# Patient Record
Sex: MALE | Race: WHITE | HISPANIC OR LATINO | URBAN - METROPOLITAN AREA
[De-identification: names, ages, dates, MRNs, and addresses within clinical notes are randomized per-mention and may not be internally consistent; named-entity substitution may affect disease eponyms.]

---

## 2023-07-04 ENCOUNTER — HOSPITAL ENCOUNTER (INPATIENT)
Facility: HOSPITAL | Age: 63
LOS: 3 days | Discharge: HOME | DRG: 378 | End: 2023-07-07
Attending: EMERGENCY MEDICINE | Admitting: INTERNAL MEDICINE
Payer: COMMERCIAL

## 2023-07-04 DIAGNOSIS — K62.5 GASTROINTESTINAL HEMORRHAGE ASSOCIATED WITH ANORECTAL SOURCE: Primary | ICD-10-CM

## 2023-07-04 DIAGNOSIS — K92.2 GI BLEED: ICD-10-CM

## 2023-07-04 PROBLEM — K42.9 UMBILICAL HERNIA WITHOUT OBSTRUCTION AND WITHOUT GANGRENE: Status: ACTIVE | Noted: 2023-07-04

## 2023-07-04 LAB
ABO + RH BLD: NORMAL
ABO + RH BLD: NORMAL
ALBUMIN SERPL BCP-MCNC: 2.5 G/DL (ref 3.5–5.2)
ALP SERPL-CCNC: 37 U/L (ref 55–135)
ALT SERPL W/O P-5'-P-CCNC: 12 U/L (ref 10–44)
ANION GAP SERPL CALC-SCNC: 7 MMOL/L (ref 8–16)
AST SERPL-CCNC: 13 U/L (ref 10–40)
BASOPHILS # BLD AUTO: 0.02 K/UL (ref 0–0.2)
BASOPHILS # BLD AUTO: 0.03 K/UL (ref 0–0.2)
BASOPHILS NFR BLD: 0.3 % (ref 0–1.9)
BASOPHILS NFR BLD: 0.3 % (ref 0–1.9)
BILIRUB SERPL-MCNC: 0.2 MG/DL (ref 0.1–1)
BILIRUB UR QL STRIP: NEGATIVE
BLD GP AB SCN CELLS X3 SERPL QL: NORMAL
BLD PROD TYP BPU: NORMAL
BLOOD UNIT EXPIRATION DATE: NORMAL
BLOOD UNIT TYPE CODE: 5100
BLOOD UNIT TYPE: NORMAL
BNP SERPL-MCNC: <10 PG/ML (ref 0–99)
BUN SERPL-MCNC: 19 MG/DL (ref 8–23)
CALCIUM SERPL-MCNC: 7.1 MG/DL (ref 8.7–10.5)
CHLORIDE SERPL-SCNC: 112 MMOL/L (ref 95–110)
CLARITY UR REFRACT.AUTO: CLEAR
CO2 SERPL-SCNC: 22 MMOL/L (ref 23–29)
CODING SYSTEM: NORMAL
COLOR UR AUTO: YELLOW
CREAT SERPL-MCNC: 1.1 MG/DL (ref 0.5–1.4)
CROSSMATCH INTERPRETATION: NORMAL
DIFFERENTIAL METHOD: ABNORMAL
DIFFERENTIAL METHOD: ABNORMAL
DISPENSE STATUS: NORMAL
EOSINOPHIL # BLD AUTO: 0 K/UL (ref 0–0.5)
EOSINOPHIL # BLD AUTO: 0.1 K/UL (ref 0–0.5)
EOSINOPHIL NFR BLD: 0.6 % (ref 0–8)
EOSINOPHIL NFR BLD: 0.8 % (ref 0–8)
ERYTHROCYTE [DISTWIDTH] IN BLOOD BY AUTOMATED COUNT: 17.2 % (ref 11.5–14.5)
ERYTHROCYTE [DISTWIDTH] IN BLOOD BY AUTOMATED COUNT: 19.2 % (ref 11.5–14.5)
EST. GFR  (NO RACE VARIABLE): >60 ML/MIN/1.73 M^2
GLUCOSE SERPL-MCNC: 130 MG/DL (ref 70–110)
GLUCOSE UR QL STRIP: NEGATIVE
HCT VFR BLD AUTO: 17.3 % (ref 40–54)
HCT VFR BLD AUTO: 19 % (ref 40–54)
HGB BLD-MCNC: 5.2 G/DL (ref 14–18)
HGB BLD-MCNC: 6 G/DL (ref 14–18)
HGB UR QL STRIP: NEGATIVE
HYPOCHROMIA BLD QL SMEAR: ABNORMAL
IMM GRANULOCYTES # BLD AUTO: 0.01 K/UL (ref 0–0.04)
IMM GRANULOCYTES # BLD AUTO: 0.06 K/UL (ref 0–0.04)
IMM GRANULOCYTES NFR BLD AUTO: 0.2 % (ref 0–0.5)
IMM GRANULOCYTES NFR BLD AUTO: 0.7 % (ref 0–0.5)
KETONES UR QL STRIP: NEGATIVE
LEUKOCYTE ESTERASE UR QL STRIP: NEGATIVE
LYMPHOCYTES # BLD AUTO: 2.1 K/UL (ref 1–4.8)
LYMPHOCYTES # BLD AUTO: 2.4 K/UL (ref 1–4.8)
LYMPHOCYTES NFR BLD: 26.7 % (ref 18–48)
LYMPHOCYTES NFR BLD: 33.3 % (ref 18–48)
MCH RBC QN AUTO: 26.9 PG (ref 27–31)
MCH RBC QN AUTO: 28.2 PG (ref 27–31)
MCHC RBC AUTO-ENTMCNC: 30.1 G/DL (ref 32–36)
MCHC RBC AUTO-ENTMCNC: 31.6 G/DL (ref 32–36)
MCV RBC AUTO: 89 FL (ref 82–98)
MCV RBC AUTO: 90 FL (ref 82–98)
MONOCYTES # BLD AUTO: 0.3 K/UL (ref 0.3–1)
MONOCYTES # BLD AUTO: 0.6 K/UL (ref 0.3–1)
MONOCYTES NFR BLD: 5.3 % (ref 4–15)
MONOCYTES NFR BLD: 6.5 % (ref 4–15)
NEUTROPHILS # BLD AUTO: 3.7 K/UL (ref 1.8–7.7)
NEUTROPHILS # BLD AUTO: 5.8 K/UL (ref 1.8–7.7)
NEUTROPHILS NFR BLD: 60.3 % (ref 38–73)
NEUTROPHILS NFR BLD: 65 % (ref 38–73)
NITRITE UR QL STRIP: NEGATIVE
NRBC BLD-RTO: 0 /100 WBC
NRBC BLD-RTO: 0 /100 WBC
NUM UNITS TRANS PACKED RBC: NORMAL
PH UR STRIP: 6 [PH] (ref 5–8)
PLATELET # BLD AUTO: 182 K/UL (ref 150–450)
PLATELET # BLD AUTO: 186 K/UL (ref 150–450)
PLATELET BLD QL SMEAR: ABNORMAL
PMV BLD AUTO: 10.4 FL (ref 9.2–12.9)
PMV BLD AUTO: 10.4 FL (ref 9.2–12.9)
POTASSIUM SERPL-SCNC: 3.9 MMOL/L (ref 3.5–5.1)
PROT SERPL-MCNC: 4.8 G/DL (ref 6–8.4)
PROT UR QL STRIP: ABNORMAL
RBC # BLD AUTO: 1.93 M/UL (ref 4.6–6.2)
RBC # BLD AUTO: 2.13 M/UL (ref 4.6–6.2)
SODIUM SERPL-SCNC: 141 MMOL/L (ref 136–145)
SP GR UR STRIP: >1.03 (ref 1–1.03)
SPECIMEN OUTDATE: NORMAL
TROPONIN I SERPL DL<=0.01 NG/ML-MCNC: 0.01 NG/ML (ref 0–0.03)
URN SPEC COLLECT METH UR: ABNORMAL
WBC # BLD AUTO: 6.19 K/UL (ref 3.9–12.7)
WBC # BLD AUTO: 8.92 K/UL (ref 3.9–12.7)

## 2023-07-04 PROCEDURE — 99291 CRITICAL CARE FIRST HOUR: CPT

## 2023-07-04 PROCEDURE — 63600175 PHARM REV CODE 636 W HCPCS

## 2023-07-04 PROCEDURE — 86920 COMPATIBILITY TEST SPIN: CPT

## 2023-07-04 PROCEDURE — 99223 1ST HOSP IP/OBS HIGH 75: CPT | Mod: ,,, | Performed by: INTERNAL MEDICINE

## 2023-07-04 PROCEDURE — 93005 ELECTROCARDIOGRAM TRACING: CPT

## 2023-07-04 PROCEDURE — 80053 COMPREHEN METABOLIC PANEL: CPT | Performed by: EMERGENCY MEDICINE

## 2023-07-04 PROCEDURE — 25500020 PHARM REV CODE 255: Performed by: EMERGENCY MEDICINE

## 2023-07-04 PROCEDURE — P9016 RBC LEUKOCYTES REDUCED: HCPCS

## 2023-07-04 PROCEDURE — 85025 COMPLETE CBC W/AUTO DIFF WBC: CPT | Performed by: EMERGENCY MEDICINE

## 2023-07-04 PROCEDURE — 86900 BLOOD TYPING SEROLOGIC ABO: CPT | Mod: 91 | Performed by: EMERGENCY MEDICINE

## 2023-07-04 PROCEDURE — 20000000 HC ICU ROOM

## 2023-07-04 PROCEDURE — 25000003 PHARM REV CODE 250

## 2023-07-04 PROCEDURE — 96365 THER/PROPH/DIAG IV INF INIT: CPT

## 2023-07-04 PROCEDURE — 93010 ELECTROCARDIOGRAM REPORT: CPT | Mod: ,,, | Performed by: INTERNAL MEDICINE

## 2023-07-04 PROCEDURE — 83880 ASSAY OF NATRIURETIC PEPTIDE: CPT | Performed by: EMERGENCY MEDICINE

## 2023-07-04 PROCEDURE — 84484 ASSAY OF TROPONIN QUANT: CPT | Performed by: EMERGENCY MEDICINE

## 2023-07-04 PROCEDURE — 86900 BLOOD TYPING SEROLOGIC ABO: CPT | Performed by: EMERGENCY MEDICINE

## 2023-07-04 PROCEDURE — 96360 HYDRATION IV INFUSION INIT: CPT | Mod: 59

## 2023-07-04 PROCEDURE — 81003 URINALYSIS AUTO W/O SCOPE: CPT

## 2023-07-04 PROCEDURE — 85025 COMPLETE CBC W/AUTO DIFF WBC: CPT | Mod: 91 | Performed by: EMERGENCY MEDICINE

## 2023-07-04 PROCEDURE — 99223 PR INITIAL HOSPITAL CARE,LEVL III: ICD-10-PCS | Mod: ,,, | Performed by: INTERNAL MEDICINE

## 2023-07-04 PROCEDURE — 36430 TRANSFUSION BLD/BLD COMPNT: CPT

## 2023-07-04 PROCEDURE — 93010 EKG 12-LEAD: ICD-10-PCS | Mod: ,,, | Performed by: INTERNAL MEDICINE

## 2023-07-04 RX ORDER — METRONIDAZOLE 500 MG/100ML
500 INJECTION, SOLUTION INTRAVENOUS
Status: DISCONTINUED | OUTPATIENT
Start: 2023-07-04 | End: 2023-07-05

## 2023-07-04 RX ORDER — TALC
6 POWDER (GRAM) TOPICAL NIGHTLY PRN
Status: DISCONTINUED | OUTPATIENT
Start: 2023-07-04 | End: 2023-07-07 | Stop reason: HOSPADM

## 2023-07-04 RX ORDER — SODIUM CHLORIDE 0.9 % (FLUSH) 0.9 %
10 SYRINGE (ML) INJECTION
Status: DISCONTINUED | OUTPATIENT
Start: 2023-07-04 | End: 2023-07-07 | Stop reason: HOSPADM

## 2023-07-04 RX ORDER — CALCIUM GLUCONATE 20 MG/ML
1 INJECTION, SOLUTION INTRAVENOUS
Status: COMPLETED | OUTPATIENT
Start: 2023-07-04 | End: 2023-07-04

## 2023-07-04 RX ORDER — ACETAMINOPHEN 325 MG/1
650 TABLET ORAL EVERY 4 HOURS PRN
Status: DISCONTINUED | OUTPATIENT
Start: 2023-07-04 | End: 2023-07-07 | Stop reason: HOSPADM

## 2023-07-04 RX ORDER — ONDANSETRON 2 MG/ML
4 INJECTION INTRAMUSCULAR; INTRAVENOUS EVERY 8 HOURS PRN
Status: DISCONTINUED | OUTPATIENT
Start: 2023-07-04 | End: 2023-07-07 | Stop reason: HOSPADM

## 2023-07-04 RX ORDER — CIPROFLOXACIN 2 MG/ML
400 INJECTION, SOLUTION INTRAVENOUS
Status: DISCONTINUED | OUTPATIENT
Start: 2023-07-04 | End: 2023-07-05

## 2023-07-04 RX ORDER — HYDROCODONE BITARTRATE AND ACETAMINOPHEN 500; 5 MG/1; MG/1
TABLET ORAL
Status: DISCONTINUED | OUTPATIENT
Start: 2023-07-04 | End: 2023-07-05

## 2023-07-04 RX ADMIN — METRONIDAZOLE 500 MG: 500 INJECTION, SOLUTION INTRAVENOUS at 08:07

## 2023-07-04 RX ADMIN — CIPROFLOXACIN 400 MG: 2 INJECTION, SOLUTION INTRAVENOUS at 08:07

## 2023-07-04 RX ADMIN — CALCIUM GLUCONATE 1 G: 20 INJECTION, SOLUTION INTRAVENOUS at 03:07

## 2023-07-04 RX ADMIN — SODIUM CHLORIDE, POTASSIUM CHLORIDE, SODIUM LACTATE AND CALCIUM CHLORIDE 1000 ML: 600; 310; 30; 20 INJECTION, SOLUTION INTRAVENOUS at 01:07

## 2023-07-04 RX ADMIN — IOHEXOL 100 ML: 350 INJECTION, SOLUTION INTRAVENOUS at 03:07

## 2023-07-04 NOTE — CONSULTS
Critical Care Consult Acknowledgement.    Patient seen and evaluated at bedside. Dispo pending repeat CBC.    John Loo M.D.  [facility] Pulmonary & Critical Care Fellow

## 2023-07-04 NOTE — ASSESSMENT & PLAN NOTE
"- CTA without evidence of acute bleed  - Patient HD stable, systolics in 140s, normal mentation  - Reports one stool at 2 AM with dark bloody clots. Has had flatus with no blood expulsion.  - 2 uPRBC given this morning  - Most recent Hgb 6.2, was 5.0 in ED  - Continue to monitor for signs of rebleeding  - GI consulted and plan to scope on 7/6    Patient report resolution of lightheadedness, dizziness, and nausea. Says "he feels great."  No acute bleeding signs on CTA. Hemodynamically stable. Stepdown today.  "

## 2023-07-04 NOTE — EKG INTERPRETATIONS - EMERGENCY DEPT.
Independently interpreted by MD:  Rate 94, NSR, no stemi, no ectopy, no hypertrophy, flipped T waves inf/lat, long QT

## 2023-07-04 NOTE — HPI
Mr. Guzman is a 62 year old male with PMHx of diverticular bleeds and an umbilical hernia. He presented to the emergency with a chief complaint of hematochezia since Sunday. This morning he felt lightheaded and dizzy and he was unable to stand up, so he called the paramedics. He denies chest pain, abdominal pain, rectal pain, and shortness of breath. He denies use of blood thinners. Patient says his hematochezia is sporadic and typically resolves on its own. Patient lives in [place] and is seen by GI docs at home. Believes last colonoscopy was 2 or 3 years ago.

## 2023-07-04 NOTE — SUBJECTIVE & OBJECTIVE
Past Medical History:   Diagnosis Date    Diverticular disease of large intestine without perforation or abscess     Hypertension        History reviewed. No pertinent surgical history.    Review of patient's allergies indicates:  No Known Allergies    Family History    None       Tobacco Use    Smoking status: Not on file    Smokeless tobacco: Not on file   Substance and Sexual Activity    Alcohol use: Not on file    Drug use: Not on file    Sexual activity: Not on file      Review of Systems   Constitutional:  Negative for chills, diaphoresis and fever.   Respiratory:  Negative for cough, chest tightness and shortness of breath.    Cardiovascular:  Negative for chest pain, palpitations and leg swelling.   Gastrointestinal:  Positive for blood in stool. Negative for abdominal pain, nausea, rectal pain and vomiting.        Dark blood in stool on BM at 2 AM   Genitourinary:  Negative for decreased urine volume, difficulty urinating, dysuria, flank pain and hematuria.   Musculoskeletal:  Negative for back pain, joint swelling, myalgias and neck pain.   Skin:  Negative for color change and pallor.   Neurological:  Negative for dizziness, syncope and light-headedness.   Objective:     Vital Signs (Most Recent):  Temp: (P) 97.9 °F (36.6 °C) (07/05/23 0800)  Pulse: 77 (07/05/23 0615)  Resp: 14 (07/05/23 0615)  BP: 125/62 (07/05/23 0615)  SpO2: (!) 94 % (07/05/23 0615) Vital Signs (24h Range):  Temp:  [97.7 °F (36.5 °C)-99.4 °F (37.4 °C)] (P) 97.9 °F (36.6 °C)  Pulse:  [71-95] 77  Resp:  [10-33] 14  SpO2:  [93 %-100 %] 94 %  BP: (108-177)/(51-84) 125/62   Weight: (!) 136.9 kg (301 lb 13 oz)  Body mass index is 43.31 kg/m².      Intake/Output Summary (Last 24 hours) at 7/5/2023 1012  Last data filed at 7/5/2023 0600  Gross per 24 hour   Intake 2937.51 ml   Output --   Net 2937.51 ml          Physical Exam  Constitutional:       General: He is not in acute distress.     Appearance: Normal appearance. He is not ill-appearing.    HENT:      Head: Normocephalic and atraumatic.      Mouth/Throat:      Mouth: Mucous membranes are dry.   Eyes:      General: No scleral icterus.     Extraocular Movements: Extraocular movements intact.   Cardiovascular:      Rate and Rhythm: Normal rate and regular rhythm.      Heart sounds: Normal heart sounds. No murmur heard.    No gallop.   Pulmonary:      Effort: Pulmonary effort is normal. No respiratory distress.      Breath sounds: Normal breath sounds. No wheezing or rhonchi.   Abdominal:      General: Bowel sounds are normal.      Palpations: Abdomen is soft. There is no mass.      Tenderness: There is no abdominal tenderness. There is no guarding.      Hernia: A hernia is present.      Comments: Known umbilical hernia   Musculoskeletal:         General: No swelling or tenderness.      Cervical back: Normal range of motion and neck supple.      Right lower leg: No edema.      Left lower leg: No edema.   Skin:     General: Skin is warm and dry.   Neurological:      General: No focal deficit present.      Mental Status: He is oriented to person, place, and time.   Psychiatric:         Mood and Affect: Mood normal.         Behavior: Behavior normal.          Vents:     Lines/Drains/Airways       Peripheral Intravenous Line  Duration                  Peripheral IV - Single Lumen 07/04/23 18 G Left Antecubital 1 day         Peripheral IV - Single Lumen 07/04/23 1422 20 G Anterior;Distal;Right Upper Arm <1 day                  Significant Labs:    CBC/Anemia Profile:  Recent Labs   Lab 07/04/23  1353 07/04/23  1735 07/05/23  0223   WBC 6.19 8.92 7.29   HGB 5.2* 6.0* 6.2*   HCT 17.3* 19.0* 18.7*    186 161   MCV 90 89 89   RDW 19.2* 17.2* 17.0*        Chemistries:  Recent Labs   Lab 07/04/23  1353 07/05/23  0223    140   K 3.9 3.8   * 112*   CO2 22* 22*   BUN 19 17   CREATININE 1.1 1.1   CALCIUM 7.1* 7.0*   ALBUMIN 2.5*  --    PROT 4.8*  --    BILITOT 0.2  --    ALKPHOS 37*  --    ALT 12  --     AST 13  --    MG  --  2.1   PHOS  --  3.3         Significant Imaging: I have reviewed all pertinent imaging results/findings within the past 24 hours. CTA not concerning for an acute bleed.

## 2023-07-04 NOTE — PROVIDER PROGRESS NOTES - EMERGENCY DEPT.
Encounter Date: 7/4/2023    ED Physician Progress Notes          ED staff SIGN OUT NOTE    Patient s/o to me by Dr. Flynn pending transfusion, CTA, admission     Critical Care   Date: 07/07/2023  Performed by: Latricia Ugalde MD   Authorized by: Latricia Ugalde MD      Total critical care time (exclusive of procedural time) : 45 minutes, exclusive of separately billable procedures and treating other patients and teaching time.    Critical care was necessary to treat or prevent imminent or life-threatening deterioration of the following conditions:  GI bleed, acute blood loss anemia    Due to a high probability of clinically significant, life threatening deterioration, the patient required my highest level of preparedness to intervene emergently and I personally spent this critical care time directly and personally managing the patient. This critical care time included obtaining a history; examining the patient; pulse oximetry; ordering and review of studies; arranging urgent treatment with development of a management plan; evaluation of patient's response to treatment; frequent reassessment, documentation, and, discussions with other providers, patient and family members.    1530: on re-eval patient is resting comfortably with normal vital signs    I was called back to the patient's room - patient was witnessed to have a large bloody bowel movement with bright red blood and clots without any stool noted.     Transient blood pressure drop to , no tachycardia noted    Continued blood transfusion at increased rate     Discussed with ICU who evaluated patient and accepted the patient to ICU after repeat hgb 6

## 2023-07-04 NOTE — HOSPITAL COURSE
"7/4 Patient with Hgb of 5.2 and given 2 units of RBC. Patient also had bowel movement with several dark clots. Patient denies lightheadedness, pain and says "he feels so much better." Blood pressure normotensive to hypertensive. Heart rate WNL. 7/5 Pt reported 1 small bloody BM at 2 AM. Received 2 unites of blood and most recent Hgb 6.2. GI consulted. Plan for scope tomorrow.     Patient is from [place]. His PCP is Dr. Boyer at [facility] in [facility] and is unsure if he has a GI doctor.  "

## 2023-07-05 ENCOUNTER — ANESTHESIA EVENT (OUTPATIENT)
Dept: ENDOSCOPY | Facility: HOSPITAL | Age: 63
DRG: 378 | End: 2023-07-05
Payer: COMMERCIAL

## 2023-07-05 PROBLEM — I10 HTN (HYPERTENSION): Status: ACTIVE | Noted: 2023-07-05

## 2023-07-05 LAB
ANION GAP SERPL CALC-SCNC: 6 MMOL/L (ref 8–16)
BASOPHILS # BLD AUTO: 0.01 K/UL (ref 0–0.2)
BASOPHILS # BLD AUTO: 0.01 K/UL (ref 0–0.2)
BASOPHILS # BLD AUTO: 0.02 K/UL (ref 0–0.2)
BASOPHILS # BLD AUTO: 0.03 K/UL (ref 0–0.2)
BASOPHILS NFR BLD: 0.1 % (ref 0–1.9)
BASOPHILS NFR BLD: 0.2 % (ref 0–1.9)
BASOPHILS NFR BLD: 0.3 % (ref 0–1.9)
BASOPHILS NFR BLD: 0.4 % (ref 0–1.9)
BLD PROD TYP BPU: NORMAL
BLD PROD TYP BPU: NORMAL
BLOOD UNIT EXPIRATION DATE: NORMAL
BLOOD UNIT EXPIRATION DATE: NORMAL
BLOOD UNIT TYPE CODE: 5100
BLOOD UNIT TYPE CODE: 5100
BLOOD UNIT TYPE: NORMAL
BLOOD UNIT TYPE: NORMAL
BUN SERPL-MCNC: 17 MG/DL (ref 8–23)
CALCIUM SERPL-MCNC: 7 MG/DL (ref 8.7–10.5)
CHLORIDE SERPL-SCNC: 112 MMOL/L (ref 95–110)
CO2 SERPL-SCNC: 22 MMOL/L (ref 23–29)
CODING SYSTEM: NORMAL
CODING SYSTEM: NORMAL
CREAT SERPL-MCNC: 1.1 MG/DL (ref 0.5–1.4)
CROSSMATCH INTERPRETATION: NORMAL
CROSSMATCH INTERPRETATION: NORMAL
DIFFERENTIAL METHOD: ABNORMAL
DISPENSE STATUS: NORMAL
DISPENSE STATUS: NORMAL
EOSINOPHIL # BLD AUTO: 0.1 K/UL (ref 0–0.5)
EOSINOPHIL # BLD AUTO: 0.2 K/UL (ref 0–0.5)
EOSINOPHIL NFR BLD: 1 % (ref 0–8)
EOSINOPHIL NFR BLD: 1.3 % (ref 0–8)
EOSINOPHIL NFR BLD: 1.9 % (ref 0–8)
EOSINOPHIL NFR BLD: 2.5 % (ref 0–8)
ERYTHROCYTE [DISTWIDTH] IN BLOOD BY AUTOMATED COUNT: 16.4 % (ref 11.5–14.5)
ERYTHROCYTE [DISTWIDTH] IN BLOOD BY AUTOMATED COUNT: 16.6 % (ref 11.5–14.5)
ERYTHROCYTE [DISTWIDTH] IN BLOOD BY AUTOMATED COUNT: 16.9 % (ref 11.5–14.5)
ERYTHROCYTE [DISTWIDTH] IN BLOOD BY AUTOMATED COUNT: 17 % (ref 11.5–14.5)
EST. GFR  (NO RACE VARIABLE): >60 ML/MIN/1.73 M^2
GLUCOSE SERPL-MCNC: 117 MG/DL (ref 70–110)
HCT VFR BLD AUTO: 18.7 % (ref 40–54)
HCT VFR BLD AUTO: 20.9 % (ref 40–54)
HCT VFR BLD AUTO: 23.4 % (ref 40–54)
HCT VFR BLD AUTO: 25.2 % (ref 40–54)
HGB BLD-MCNC: 6.2 G/DL (ref 14–18)
HGB BLD-MCNC: 6.9 G/DL (ref 14–18)
HGB BLD-MCNC: 7.6 G/DL (ref 14–18)
HGB BLD-MCNC: 8.3 G/DL (ref 14–18)
IMM GRANULOCYTES # BLD AUTO: 0.02 K/UL (ref 0–0.04)
IMM GRANULOCYTES NFR BLD AUTO: 0.3 % (ref 0–0.5)
IMM GRANULOCYTES NFR BLD AUTO: 0.4 % (ref 0–0.5)
LYMPHOCYTES # BLD AUTO: 1.6 K/UL (ref 1–4.8)
LYMPHOCYTES # BLD AUTO: 1.6 K/UL (ref 1–4.8)
LYMPHOCYTES # BLD AUTO: 1.8 K/UL (ref 1–4.8)
LYMPHOCYTES # BLD AUTO: 2 K/UL (ref 1–4.8)
LYMPHOCYTES NFR BLD: 21.5 % (ref 18–48)
LYMPHOCYTES NFR BLD: 22.2 % (ref 18–48)
LYMPHOCYTES NFR BLD: 29.9 % (ref 18–48)
LYMPHOCYTES NFR BLD: 32 % (ref 18–48)
MAGNESIUM SERPL-MCNC: 2.1 MG/DL (ref 1.6–2.6)
MCH RBC QN AUTO: 29.4 PG (ref 27–31)
MCH RBC QN AUTO: 29.4 PG (ref 27–31)
MCH RBC QN AUTO: 29.5 PG (ref 27–31)
MCH RBC QN AUTO: 29.6 PG (ref 27–31)
MCHC RBC AUTO-ENTMCNC: 32.5 G/DL (ref 32–36)
MCHC RBC AUTO-ENTMCNC: 32.9 G/DL (ref 32–36)
MCHC RBC AUTO-ENTMCNC: 33 G/DL (ref 32–36)
MCHC RBC AUTO-ENTMCNC: 33.2 G/DL (ref 32–36)
MCV RBC AUTO: 89 FL (ref 82–98)
MCV RBC AUTO: 91 FL (ref 82–98)
MONOCYTES # BLD AUTO: 0.3 K/UL (ref 0.3–1)
MONOCYTES # BLD AUTO: 0.4 K/UL (ref 0.3–1)
MONOCYTES NFR BLD: 4.3 % (ref 4–15)
MONOCYTES NFR BLD: 5.4 % (ref 4–15)
MONOCYTES NFR BLD: 5.9 % (ref 4–15)
MONOCYTES NFR BLD: 6.1 % (ref 4–15)
NEUTROPHILS # BLD AUTO: 3.4 K/UL (ref 1.8–7.7)
NEUTROPHILS # BLD AUTO: 4.1 K/UL (ref 1.8–7.7)
NEUTROPHILS # BLD AUTO: 5.1 K/UL (ref 1.8–7.7)
NEUTROPHILS # BLD AUTO: 5.5 K/UL (ref 1.8–7.7)
NEUTROPHILS NFR BLD: 59.4 % (ref 38–73)
NEUTROPHILS NFR BLD: 61.5 % (ref 38–73)
NEUTROPHILS NFR BLD: 70.3 % (ref 38–73)
NEUTROPHILS NFR BLD: 72.5 % (ref 38–73)
NRBC BLD-RTO: 0 /100 WBC
PHOSPHATE SERPL-MCNC: 3.3 MG/DL (ref 2.7–4.5)
PLATELET # BLD AUTO: 132 K/UL (ref 150–450)
PLATELET # BLD AUTO: 142 K/UL (ref 150–450)
PLATELET # BLD AUTO: 161 K/UL (ref 150–450)
PLATELET # BLD AUTO: 171 K/UL (ref 150–450)
PMV BLD AUTO: 10.1 FL (ref 9.2–12.9)
PMV BLD AUTO: 10.4 FL (ref 9.2–12.9)
PMV BLD AUTO: 10.7 FL (ref 9.2–12.9)
PMV BLD AUTO: 9.8 FL (ref 9.2–12.9)
POTASSIUM SERPL-SCNC: 3.8 MMOL/L (ref 3.5–5.1)
RBC # BLD AUTO: 2.1 M/UL (ref 4.6–6.2)
RBC # BLD AUTO: 2.35 M/UL (ref 4.6–6.2)
RBC # BLD AUTO: 2.57 M/UL (ref 4.6–6.2)
RBC # BLD AUTO: 2.82 M/UL (ref 4.6–6.2)
RETICS/RBC NFR AUTO: 2.3 % (ref 0.4–2)
SODIUM SERPL-SCNC: 140 MMOL/L (ref 136–145)
TRANS ERYTHROCYTES VOL PATIENT: NORMAL ML
TRANS ERYTHROCYTES VOL PATIENT: NORMAL ML
WBC # BLD AUTO: 5.71 K/UL (ref 3.9–12.7)
WBC # BLD AUTO: 6.68 K/UL (ref 3.9–12.7)
WBC # BLD AUTO: 7.29 K/UL (ref 3.9–12.7)
WBC # BLD AUTO: 7.59 K/UL (ref 3.9–12.7)

## 2023-07-05 PROCEDURE — 63600175 PHARM REV CODE 636 W HCPCS

## 2023-07-05 PROCEDURE — 94761 N-INVAS EAR/PLS OXIMETRY MLT: CPT

## 2023-07-05 PROCEDURE — P9021 RED BLOOD CELLS UNIT: HCPCS

## 2023-07-05 PROCEDURE — 20600001 HC STEP DOWN PRIVATE ROOM

## 2023-07-05 PROCEDURE — 36430 TRANSFUSION BLD/BLD COMPNT: CPT

## 2023-07-05 PROCEDURE — 36415 COLL VENOUS BLD VENIPUNCTURE: CPT

## 2023-07-05 PROCEDURE — C9113 INJ PANTOPRAZOLE SODIUM, VIA: HCPCS

## 2023-07-05 PROCEDURE — 85025 COMPLETE CBC W/AUTO DIFF WBC: CPT | Mod: 91 | Performed by: INTERNAL MEDICINE

## 2023-07-05 PROCEDURE — 99222 1ST HOSP IP/OBS MODERATE 55: CPT | Mod: ,,, | Performed by: INTERNAL MEDICINE

## 2023-07-05 PROCEDURE — 25000003 PHARM REV CODE 250: Performed by: STUDENT IN AN ORGANIZED HEALTH CARE EDUCATION/TRAINING PROGRAM

## 2023-07-05 PROCEDURE — 84100 ASSAY OF PHOSPHORUS: CPT

## 2023-07-05 PROCEDURE — 99233 PR SUBSEQUENT HOSPITAL CARE,LEVL III: ICD-10-PCS | Mod: ,,, | Performed by: INTERNAL MEDICINE

## 2023-07-05 PROCEDURE — 85045 AUTOMATED RETICULOCYTE COUNT: CPT

## 2023-07-05 PROCEDURE — 99900035 HC TECH TIME PER 15 MIN (STAT)

## 2023-07-05 PROCEDURE — 99222 PR INITIAL HOSPITAL CARE,LEVL II: ICD-10-PCS | Mod: ,,, | Performed by: INTERNAL MEDICINE

## 2023-07-05 PROCEDURE — 85025 COMPLETE CBC W/AUTO DIFF WBC: CPT

## 2023-07-05 PROCEDURE — 80048 BASIC METABOLIC PNL TOTAL CA: CPT

## 2023-07-05 PROCEDURE — 99233 SBSQ HOSP IP/OBS HIGH 50: CPT | Mod: ,,, | Performed by: INTERNAL MEDICINE

## 2023-07-05 PROCEDURE — 83735 ASSAY OF MAGNESIUM: CPT

## 2023-07-05 RX ORDER — POLYETHYLENE GLYCOL 3350, SODIUM SULFATE ANHYDROUS, SODIUM BICARBONATE, SODIUM CHLORIDE, POTASSIUM CHLORIDE 236; 22.74; 6.74; 5.86; 2.97 G/4L; G/4L; G/4L; G/4L; G/4L
4000 POWDER, FOR SOLUTION ORAL ONCE
Status: COMPLETED | OUTPATIENT
Start: 2023-07-05 | End: 2023-07-05

## 2023-07-05 RX ORDER — PANTOPRAZOLE SODIUM 40 MG/10ML
40 INJECTION, POWDER, LYOPHILIZED, FOR SOLUTION INTRAVENOUS 2 TIMES DAILY
Status: DISCONTINUED | OUTPATIENT
Start: 2023-07-05 | End: 2023-07-06

## 2023-07-05 RX ORDER — HYDROCODONE BITARTRATE AND ACETAMINOPHEN 500; 5 MG/1; MG/1
TABLET ORAL
Status: DISCONTINUED | OUTPATIENT
Start: 2023-07-05 | End: 2023-07-07 | Stop reason: HOSPADM

## 2023-07-05 RX ADMIN — PANTOPRAZOLE SODIUM 40 MG: 40 INJECTION, POWDER, FOR SOLUTION INTRAVENOUS at 10:07

## 2023-07-05 RX ADMIN — METRONIDAZOLE 500 MG: 500 INJECTION, SOLUTION INTRAVENOUS at 01:07

## 2023-07-05 RX ADMIN — METRONIDAZOLE 500 MG: 500 INJECTION, SOLUTION INTRAVENOUS at 03:07

## 2023-07-05 RX ADMIN — PANTOPRAZOLE SODIUM 40 MG: 40 INJECTION, POWDER, FOR SOLUTION INTRAVENOUS at 09:07

## 2023-07-05 RX ADMIN — POLYETHYLENE GLYCOL 3350, SODIUM SULFATE ANHYDROUS, SODIUM BICARBONATE, SODIUM CHLORIDE, POTASSIUM CHLORIDE 4000 ML: 236; 22.74; 6.74; 5.86; 2.97 POWDER, FOR SOLUTION ORAL at 06:07

## 2023-07-05 RX ADMIN — CIPROFLOXACIN 400 MG: 2 INJECTION, SOLUTION INTRAVENOUS at 09:07

## 2023-07-05 NOTE — PLAN OF CARE
CMICU DAILY GOALS       A: Awake    RASS: Goal - RASS Goal: 0-->alert and calm  Actual - RASS (Juares Agitation-Sedation Scale): alert and calm   Restraint necessity:    B: Breathe   SBT: Not intubated   C: Coordinate A & B, analgesics/sedatives   Pain: managed    SAT: Not intubated  D: Delirium   CAM-ICU: Overall CAM-ICU: Negative  E: Early(intubated/ Progressive (non-intubated) Mobility   MOVE Screen: Pass   Activity: Activity Management: Ambulated to bathroom - L4  FAS: Feeding/Nutrition   Diet order: Diet/Nutrition Received: clear liquid,    T: Thrombus   DVT prophylaxis:    H: HOB Elevation   Head of Bed (HOB) Positioning: HOB at 30-45 degrees  U: Ulcer Prophylaxis   GI: yes  G: Glucose control   managed    S: Skin      Device Skin Pressure Protection: adhesive use limited  Pressure Reduction Devices: alternating pressure pump (ADD)  Pressure Reduction Techniques: frequent weight shift encouraged  Skin Protection: adhesive use limited  B: Bowel Function   no issues   I: Indwelling Catheters   Merrill necessity:     CVC necessity: No  D: De-escalation Antibiotics   Yes    Family/Goals of care/Code Status   Code Status: Full Code    24H Vital Sign Range  Temp:  [97.9 °F (36.6 °C)-99.1 °F (37.3 °C)]   Pulse:  [63-86]   Resp:  [13-33]   BP: (115-151)/(58-84)   SpO2:  [93 %-100 %]      Shift Events   No acute events throughout shift    VS and assessment per flow sheet, patient progressing towards goals as tolerated, plan of care reviewed with  patient, his son and wife , all concerns addressed, will continue to monitor.    Sahra Lawrence RN  Problem: Adult Inpatient Plan of Care  Goal: Plan of Care Review  Outcome: Ongoing, Progressing  Goal: Patient-Specific Goal (Individualized)  Outcome: Ongoing, Progressing  Goal: Absence of Hospital-Acquired Illness or Injury  Outcome: Ongoing, Progressing  Goal: Optimal Comfort and Wellbeing  Outcome: Ongoing, Progressing  Goal: Readiness for Transition of Care  Outcome:  Ongoing, Progressing     Problem: Bariatric Environmental Safety  Goal: Safety Maintained with Care  Outcome: Ongoing, Progressing

## 2023-07-05 NOTE — PLAN OF CARE
[facility] - Cardiac Medical ICU  Initial Discharge Assessment       Primary Care Provider: Primary Doctor No    Admission Diagnosis: GI bleed [K92.2]    Admission Date: 7/4/2023  Expected Discharge Date:     Transition of Care Barriers: None    Payor: [employer] / Plan: [facility] CHOICE PLUS / Product Type: Commercial /     Extended Emergency Contact Information  Primary Emergency Contact: Arsenio Guzmanron  Mobile Phone: [phone]  Relation: Son  Preferred language: English   needed? No    Discharge Plan A: Home with family  Discharge Plan B: Home    No Pharmacies Listed    Initial Assessment (most recent)       Adult Discharge Assessment - 07/05/23 1706          Discharge Assessment    Assessment Type Discharge Planning Assessment     Confirmed/corrected address, phone number and insurance Yes     Confirmed Demographics Correct on Facesheet     Source of Information patient     Communicated ZEHRA with patient/caregiver Date not available/Unable to determine     Reason For Admission GI Bleed     People in Home spouse     Facility Arrived From: home     Do you expect to return to your current living situation? Yes     Do you have help at home or someone to help you manage your care at home? Yes     Who are your caregiver(s) and their phone number(s)? Ezekiel Guzman (son) [phone]     Prior to hospitilization cognitive status: Alert/Oriented     Current cognitive status: Alert/Oriented     Equipment Currently Used at Home cane, straight     Readmission within 30 days? No     Patient currently being followed by outpatient case management? No     Do you currently have service(s) that help you manage your care at home? No     Do you take prescription medications? Yes     Do you have prescription coverage? Yes     Coverage [facility] Choice Plus     Do you have any problems affording any of your prescribed medications? No     Is the patient taking medications as prescribed? yes     Who is going to help you get home  at discharge? Ezekiel Guzman (son) [phone] or wife     How do you get to doctors appointments? car, drives self     Are you on dialysis? No     Do you take coumadin? No     Discharge Plan A Home with family     Discharge Plan B Home     DME Needed Upon Discharge  none     Discharge Plan discussed with: Patient     Transition of Care Barriers None        Physical Activity    On average, how many days per week do you engage in moderate to strenuous exercise (like a brisk walk)? 0 days     On average, how many minutes do you engage in exercise at this level? 0 min        Financial Resource Strain    How hard is it for you to pay for the very basics like food, housing, medical care, and heating? Not hard at all        Housing Stability    In the last 12 months, was there a time when you were not able to pay the mortgage or rent on time? No     In the last 12 months, how many places have you lived? 1     In the last 12 months, was there a time when you did not have a steady place to sleep or slept in a shelter (including now)? No        Transportation Needs    In the past 12 months, has lack of transportation kept you from medical appointments or from getting medications? No     In the past 12 months, has lack of transportation kept you from meetings, work, or from getting things needed for daily living? No        Food Insecurity    Within the past 12 months, you worried that your food would run out before you got the money to buy more. Never true     Within the past 12 months, the food you bought just didn't last and you didn't have money to get more. Never true        Stress    Do you feel stress - tense, restless, nervous, or anxious, or unable to sleep at night because your mind is troubled all the time - these days? To some extent        Social Connections    In a typical week, how many times do you talk on the phone with family, friends, or neighbors? More than three times a week     How often do you get  together with friends or relatives? More than three times a week     How often do you attend Advent or Restorationist services? More than 4 times per year     Do you belong to any clubs or organizations such as Advent groups, unions, fraternal or athletic groups, or school groups? No     How often do you attend meetings of the clubs or organizations you belong to? Never     Are you , , , , never , or living with a partner?         Alcohol Use    Q1: How often do you have a drink containing alcohol? Monthly or less     Q2: How many drinks containing alcohol do you have on a typical day when you are drinking? 1 or 2     Q3: How often do you have six or more drinks on one occasion? Never        OTHER    Name(s) of People in Home spouse                      CM met with patient at the bedside and discussed the discharge process with him and gave him the discharge booklet and wrote contact number on the white board in the room. He was in town working and stated he had some issues with his diverticulitis and came to this hospital . He lives in a single story house with his spouse. One step threshold to port of entry.He stated he has a straight cane and is not on dialysis nor on coumadin. Patient did not voice any concerns and have any questions. Will continue to monitor for discharge needs.     Francisco Dominguez RN    [phone]

## 2023-07-05 NOTE — RESIDENT HANDOFF
"Critical Care Handoff     Primary Team: Networked reference to record PCT  Room Number: 6076/6076 A     Patient Name: Poncho Guzman MRN: [id number]     Date of Birth: [id number] Allergies: Patient has no known allergies.     Age: 62 y.o. Admit Date: 7/4/2023     Sex: male  BMI: Body mass index is 43.31 kg/m².     Code Status: Full Code        llness Level (current clinical status): Watcher - No    Reason for Admission: GI bleed    Brief HPI (pertinent PMH and diagnosis or differential diagnosis): Mr. Guzman is a 62 year old male with PMHx of diverticular bleeds and an umbilical hernia. He presented to the emergency with a chief complaint of hematochezia since Sunday. This morning he felt lightheaded and dizzy and he was unable to stand up, so he called the paramedics. He denies chest pain, abdominal pain, rectal pain, and shortness of breath. He denies use of blood thinners. Patient says his hematochezia is sporadic and typically resolves on its own. Patient lives in [place] and is seen by GI docs at home. Believes last colonoscopy was 2 or 3 years ago.     Procedure Date: Colonoscopy scheduled 7/6    Hospital Course (updated, brief assessment by system or problem, significant events): 7/4 Patient with Hgb of 5.2 and given 2 units of RBC. Patient also had bowel movement with several dark clots. Patient denies lightheadedness, pain and says "he feels so much better." Blood pressure normotensive to hypertensive. Heart rate WNL. 7/5 Pt reported 1 small bloody BM at 2 AM. Received 2 unites of blood and most recent Hgb 6.2. GI consulted.     Tasks (specific, using if-then statements): If lightheadedness, dizziness, hematochezia or other signs of acute bleed then check CBC and transfuse as appropriate.     Contingency Plan (special circumstances anticipated and plan): CT and symptoms suggest no acute bleed. If clinical symptoms appear or worsen, repeat imaging, transfuse as appropriate, consult GI.    Estimated Discharge " Date: TBD    Discharge Disposition: Home or Self Care    Stephanie Pacheco, PGY-1    Callback #[phone]

## 2023-07-05 NOTE — PLAN OF CARE
CMICU DAILY GOALS       A: Awake    RASS: Goal -    Actual -     Restraint necessity:    B: Breathe   SBT: Not intubated   C: Coordinate A & B, analgesics/sedatives   Pain: managed    SAT: Not intubated  D: Delirium   CAM-ICU: Overall CAM-ICU: Negative  E: Early(intubated/ Progressive (non-intubated) Mobility   MOVE Screen: Pass   Activity: Activity Management: Rolling - L1  FAS: Feeding/Nutrition   Diet order: Diet/Nutrition Received: clear liquid,    T: Thrombus   DVT prophylaxis:    H: HOB Elevation   Head of Bed (HOB) Positioning: HOB at 30-45 degrees  U: Ulcer Prophylaxis   GI: yes  G: Glucose control   managed    S: Skin      Device Skin Pressure Protection: absorbent pad utilized/changed, adhesive use limited, positioning supports utilized  Pressure Reduction Devices: pressure-redistributing mattress utilized  Pressure Reduction Techniques: frequent weight shift encouraged, heels elevated off bed, pressure points protected  Skin Protection: adhesive use limited, incontinence pads utilized  B: Bowel Function   Stools with blood clots.    I: Indwelling Catheters   Merrill necessity:     CVC necessity: No  D: De-escalation Antibiotics   No    Family/Goals of care/Code Status   Code Status: Full Code    24H Vital Sign Range  Temp:  [97.7 °F (36.5 °C)-99.4 °F (37.4 °C)]   Pulse:  [71-95]   Resp:  [10-33]   BP: (108-177)/(51-84)   SpO2:  [93 %-100 %]      Shift Events   Admitted from the ED. Transfused the 1 unit PRBC that was not transfused in ED. Hgb 6.2 post transfusion. Another 2 units PRBC order and currently transfusing. Had 1 bowel movement with blood clots.    VS and assessment per flow sheet, patient progressing towards goals as tolerated, plan of care reviewed with  pt , all concerns addressed, will continue to monitor.

## 2023-07-05 NOTE — SUBJECTIVE & OBJECTIVE
Past Medical History:   Diagnosis Date    Diverticular disease of large intestine without perforation or abscess     Hypertension        History reviewed. No pertinent surgical history.    Review of patient's allergies indicates:  No Known Allergies  Family History    None       Tobacco Use    Smoking status: Not on file    Smokeless tobacco: Not on file   Substance and Sexual Activity    Alcohol use: Not on file    Drug use: Not on file    Sexual activity: Not on file     Review of Systems   Gastrointestinal:  Positive for blood in stool and diarrhea. Negative for constipation, nausea and vomiting.   Neurological:  Positive for dizziness and light-headedness.   Objective:     Vital Signs (Most Recent):  Temp: 98.5 °F (36.9 °C) (07/05/23 0615)  Pulse: 77 (07/05/23 0615)  Resp: 14 (07/05/23 0615)  BP: 125/62 (07/05/23 0615)  SpO2: (!) 94 % (07/05/23 0615) Vital Signs (24h Range):  Temp:  [97.7 °F (36.5 °C)-99.4 °F (37.4 °C)] 98.5 °F (36.9 °C)  Pulse:  [71-95] 77  Resp:  [10-33] 14  SpO2:  [93 %-100 %] 94 %  BP: (108-177)/(51-84) 125/62     Weight: (!) 136.9 kg (301 lb 13 oz) (07/04/23 2200)  Body mass index is 43.31 kg/m².      Intake/Output Summary (Last 24 hours) at 7/5/2023 0853  Last data filed at 7/5/2023 0600  Gross per 24 hour   Intake 2937.51 ml   Output --   Net 2937.51 ml       Lines/Drains/Airways       Peripheral Intravenous Line  Duration                  Peripheral IV - Single Lumen 07/04/23 18 G Left Antecubital 1 day         Peripheral IV - Single Lumen 07/04/23 1422 20 G Anterior;Distal;Right Upper Arm <1 day                     Physical Exam  Constitutional:       Appearance: Normal appearance.   Eyes:      Conjunctiva/sclera: Conjunctivae normal.   Abdominal:      General: Bowel sounds are normal.      Tenderness: There is no abdominal tenderness. There is no guarding.   Skin:     General: Skin is warm and dry.      Coloration: Skin is not pale.   Neurological:      Mental Status: He is alert.         Significant Labs:  CBC:   Recent Labs   Lab 07/04/23  1353 07/04/23  1735 07/05/23  0223   WBC 6.19 8.92 7.29   HGB 5.2* 6.0* 6.2*   HCT 17.3* 19.0* 18.7*    186 161     CMP:   Recent Labs   Lab 07/04/23  1353 07/05/23  0223   * 117*   CALCIUM 7.1* 7.0*   ALBUMIN 2.5*  --    PROT 4.8*  --     140   K 3.9 3.8   CO2 22* 22*   * 112*   BUN 19 17   CREATININE 1.1 1.1   ALKPHOS 37*  --    ALT 12  --    AST 13  --    BILITOT 0.2  --      Coagulation: No results for input(s): PT, INR, APTT in the last 48 hours.    Significant Imaging: CTA Acute GI Bleed, Abdomen, Pelvis   Impression:       No evidence of arterial contrast extravasation with pooling to suggest active bleed.     Diverticulosis coli with very subtle possible changes related to diverticulitis in the sigmoid colon.

## 2023-07-05 NOTE — HPI
Mr. Guzman is a 61yo M for which GI was consulted for hematochezia. Pt has a h/o diverticular bleeds, HTN and umbilical hernia. He presented to the ED 1 day ago for a 2 day history of hematochezia (8-10 episodes) associated with lightheadedness and dizziness, H/H 5.2/17.3. States he has had similar bleeding in the past that has self resolved and did not result in hospital admission. Diverticulosis seen on CTA but negative for acute bleeding. Has received a total of 5 units of pRBC (3u 7/4, 2u 7/5) since admission, H/H today 6.2/18.7.     Reports one bloody bowel movement since admission and some abdominal discomfort due to gas. Lightheadedness/dizziness has resolved. Denies n/v. Pt denies h/o reflux, use of NSAIDs or blood thinners, hemorrhoids, fissures, smoking or alcohol use. Pt lives in [place] and is here for work. Last colonoscopy 2017 in [place].

## 2023-07-05 NOTE — ANESTHESIA PREPROCEDURE EVALUATION
[facility]  Anesthesia Pre-Operative Evaluation         Patient Name: Poncho Guzman  YOB: 1960  MRN: [id number]    SUBJECTIVE:     Pre-operative evaluation for Procedure(s) (LRB):  EGD (ESOPHAGOGASTRODUODENOSCOPY) (N/A)  COLONOSCOPY (N/A)     07/05/2023    Poncho Guzman is a 62 y.o. male w/ a significant PMHx of diverticular bleeds, umbilical hernia, HTN.    Patient now presents for the above procedure(s).    No previous echo on file    LDA       Peripheral IV - Single Lumen 07/04/23 18 G Left Antecubital (Active)   Site Assessment Clean;Dry;No redness;No swelling;Intact 07/05/23 0300   Extremity Assessment Distal to IV No abnormal discoloration;No redness;No swelling;No warmth 07/05/23 0300   Line Status Infusing 07/05/23 0300   Dressing Status Clean;Dry;Intact 07/05/23 0300   Dressing Intervention Integrity maintained 07/05/23 0300   Dressing Change Due 07/08/23 07/05/23 0300   Site Change Due 07/08/23 07/04/23 2300   Reason Not Rotated Not due 07/05/23 0300   Number of days: 1            Peripheral IV - Single Lumen 07/04/23 1422 20 G Anterior;Distal;Right Upper Arm (Active)   Site Assessment Clean;Dry;Intact;No redness;No swelling 07/05/23 0300   Extremity Assessment Distal to IV No abnormal discoloration;No redness;No swelling;No warmth 07/05/23 0300   Line Status Saline locked;Flushed 07/05/23 0300   Dressing Status Clean;Dry;Intact 07/05/23 0300   Dressing Intervention Integrity maintained 07/05/23 0300   Dressing Change Due 07/08/23 07/05/23 0300   Site Change Due 07/08/23 07/04/23 2300   Reason Not Rotated Not due 07/05/23 0300   Number of days: 0       Prev airway: None documented.    Drips: None documented.      Patient Active Problem List   Diagnosis    Umbilical hernia without obstruction and without gangrene    GI bleed    HTN (hypertension)       Review of patient's allergies  indicates:  No Known Allergies    Current Inpatient Medications:   ciprofloxacin  400 mg Intravenous Q12H    metronidazole  500 mg Intravenous Q8H    pantoprazole  40 mg Intravenous BID    polyethylene glycol  4,000 mL Oral Once       No current facility-administered medications on file prior to encounter.     No current outpatient medications on file prior to encounter.       History reviewed. No pertinent surgical history.    Social History     Socioeconomic History    Marital status:        OBJECTIVE:     Vital Signs Range (Last 24H):  Temp:  [36.5 °C (97.7 °F)-37.4 °C (99.4 °F)]   Pulse:  [71-95]   Resp:  [10-33]   BP: (108-177)/(51-84)   SpO2:  [93 %-100 %]       Significant Labs:  Lab Results   Component Value Date    WBC 7.59 07/05/2023    HGB 7.6 (L) 07/05/2023    HCT 23.4 (L) 07/05/2023     (L) 07/05/2023    ALT 12 07/04/2023    AST 13 07/04/2023     07/05/2023    K 3.8 07/05/2023     (H) 07/05/2023    CREATININE 1.1 07/05/2023    BUN 17 07/05/2023    CO2 22 (L) 07/05/2023       Diagnostic Studies: No relevant studies.    EKG:   Results for orders placed or performed during the hospital encounter of 07/04/23   EKG 12-lead    Collection Time: 07/04/23  1:45 PM    Narrative    Test Reason : K92.2,    Vent. Rate : 094 BPM     Atrial Rate : 094 BPM     P-R Int : 138 ms          QRS Dur : 078 ms      QT Int : 392 ms       P-R-T Axes : 083 044 259 degrees     QTc Int : 490 ms    Normal sinus rhythm  T wave abnormality, consider inferolateral ischemia  Prolonged QT  Abnormal ECG  No previous ECGs available  Confirmed by Paul BARDALES MD ([id number]) on 7/5/2023 9:55:29 AM    Referred By: JULIAN   SELF           Confirmed By:Paul BARDALES MD       2D ECHO:  TTE:  No results found for this or any previous visit.    TOYIN:  No results found for this or any previous visit.    ASSESSMENT/PLAN:         Pre-op Assessment    I have reviewed the Patient Summary Reports.     I have reviewed the Nursing  Notes. I have reviewed the NPO Status.   I have reviewed the Medications.     Review of Systems  Anesthesia Hx:  No problems with previous Anesthesia  History of prior surgery of interest to airway management or planning: Denies Family Hx of Anesthesia complications.   Denies Personal Hx of Anesthesia complications.   Social:  Non-Smoker, No Alcohol Use    Hematology/Oncology:        Denies Current/Recent Cancer   EENT/Dental:   denies chronic allergic rhinitis   Cardiovascular:  Cardiovascular Normal  Denies Hypertension.  Denies CAD.    no hyperlipidemia    Pulmonary:  Pulmonary Normal  Denies COPD.  Denies Asthma.  Denies Sleep Apnea.    Renal/:  Renal/ Normal  Denies Chronic Renal Disease.     Hepatic/GI:  Hepatic/GI Normal  Denies GERD.    Musculoskeletal:   Denies Arthritis.     Neurological:  Neurology Normal  Denies CVA. Denies Seizures.    Psych:   Denies Psychiatric History.          Physical Exam  General: Well nourished, Cooperative, Alert and Oriented    Airway:  Mallampati: II   Mouth Opening: Normal  TM Distance: Normal  Tongue: Normal  Neck ROM: Normal ROM    Dental:  Intact        Anesthesia Plan  Type of Anesthesia, risks & benefits discussed:    Anesthesia Type: Gen Natural Airway, MAC  Intra-op Monitoring Plan: Standard ASA Monitors  Post Op Pain Control Plan: multimodal analgesia  Induction:  IV  Airway Plan: Direct, Post-Induction  Informed Consent: Informed consent signed with the Patient and all parties understand the risks and agree with anesthesia plan.  All questions answered.   ASA Score: 3  Day of Surgery Review of History & Physical: H&P Update referred to the surgeon/provider.    Ready For Surgery From Anesthesia Perspective.     .

## 2023-07-05 NOTE — CONSULTS
[facility] - Cardiac Medical ICU  Gastroenterology  Consult Note    Patient Name: Poncho Guzman  MRN: [id number]  Admission Date: 7/4/2023  Hospital Length of Stay: 1 days  Code Status: Full Code   Attending Provider: Sachi Galo MD   Consulting Provider: Trina Rios MD  Primary Care Physician: Primary Doctor No  Principal Problem:GI bleed    Inpatient consult to Gastroenterology  Consult performed by: Trina Rios MD  Consult ordered by: Stephanie Pacheco MD        Subjective:     HPI:  Mr. Guzman is a 63yo M for which GI was consulted for hematochezia. Pt has a h/o diverticular bleeds, HTN and umbilical hernia. He presented to the ED 1 day ago for a 2 day history of hematochezia (8-10 episodes) associated with lightheadedness and dizziness, H/H 5.2/17.3. States he has had similar bleeding in the past that has self resolved and did not result in hospital admission. Diverticulosis seen on CTA but negative for acute bleeding. Has received a total of 5 units of pRBC (3u 7/4, 2u 7/5) since admission, H/H today 6.2/18.7.     Reports one bloody bowel movement since admission and some abdominal discomfort due to gas. Lightheadedness/dizziness has resolved. Denies n/v. Pt denies h/o reflux, use of NSAIDs or blood thinners, hemorrhoids, fissures, smoking or alcohol use. Pt lives in [place] and is here for work. Last colonoscopy 2017 in [place].      Past Medical History:   Diagnosis Date    Diverticular disease of large intestine without perforation or abscess     Hypertension        History reviewed. No pertinent surgical history.    Review of patient's allergies indicates:  No Known Allergies  Family History    None       Tobacco Use    Smoking status: Not on file    Smokeless tobacco: Not on file   Substance and Sexual Activity    Alcohol use: Not on file    Drug use: Not on file    Sexual activity: Not on file     Review of Systems   Gastrointestinal:  Positive for blood in stool and diarrhea. Negative for  constipation, nausea and vomiting.   Neurological:  Positive for dizziness and light-headedness.   Objective:     Vital Signs (Most Recent):  Temp: 98.5 °F (36.9 °C) (07/05/23 0615)  Pulse: 77 (07/05/23 0615)  Resp: 14 (07/05/23 0615)  BP: 125/62 (07/05/23 0615)  SpO2: (!) 94 % (07/05/23 0615) Vital Signs (24h Range):  Temp:  [97.7 °F (36.5 °C)-99.4 °F (37.4 °C)] 98.5 °F (36.9 °C)  Pulse:  [71-95] 77  Resp:  [10-33] 14  SpO2:  [93 %-100 %] 94 %  BP: (108-177)/(51-84) 125/62     Weight: (!) 136.9 kg (301 lb 13 oz) (07/04/23 2200)  Body mass index is 43.31 kg/m².      Intake/Output Summary (Last 24 hours) at 7/5/2023 0853  Last data filed at 7/5/2023 0600  Gross per 24 hour   Intake 2937.51 ml   Output --   Net 2937.51 ml       Lines/Drains/Airways       Peripheral Intravenous Line  Duration                  Peripheral IV - Single Lumen 07/04/23 18 G Left Antecubital 1 day         Peripheral IV - Single Lumen 07/04/23 1422 20 G Anterior;Distal;Right Upper Arm <1 day                     Physical Exam  Constitutional:       Appearance: Normal appearance.   Eyes:      Conjunctiva/sclera: Conjunctivae normal.   Abdominal:      General: Bowel sounds are normal.      Tenderness: There is no abdominal tenderness. There is no guarding.   Skin:     General: Skin is warm and dry.      Coloration: Skin is not pale.   Neurological:      Mental Status: He is alert.        Significant Labs:  CBC:   Recent Labs   Lab 07/04/23  1353 07/04/23  1735 07/05/23  0223   WBC 6.19 8.92 7.29   HGB 5.2* 6.0* 6.2*   HCT 17.3* 19.0* 18.7*    186 161     CMP:   Recent Labs   Lab 07/04/23  1353 07/05/23  0223   * 117*   CALCIUM 7.1* 7.0*   ALBUMIN 2.5*  --    PROT 4.8*  --     140   K 3.9 3.8   CO2 22* 22*   * 112*   BUN 19 17   CREATININE 1.1 1.1   ALKPHOS 37*  --    ALT 12  --    AST 13  --    BILITOT 0.2  --      Coagulation: No results for input(s): PT, INR, APTT in the last 48 hours.    Significant Imaging: CTA Acute  GI Bleed, Abdomen, Pelvis   Impression:       No evidence of arterial contrast extravasation with pooling to suggest active bleed.     Diverticulosis coli with very subtle possible changes related to diverticulitis in the sigmoid colon.            Assessment/Plan:     GI  * GI bleed  63yo M for which GI was consulted for hematochezia. Pt has a h/o diverticular bleeds, HTN and umbilical hernia. Hematochezia and lightheadedness likely 2/2 diverticular bleed with H/H 5.2/17.3. H/H trending up slightly with 5u pRBC administered since admission. Due to significant bleeding and need for transfusions, will plan for EGD and colonoscopy tomorrow.     Recs  - Plan EGD and colonoscopy tomorrow  - Clear liquid diet okay for today, bowel prep tonight and make NPO at midnight.   - PPI 40mg IV BID  - Trend Hgb q8hrs. Transfuse for Hgb > 7, unless otherwise indicated  - Please correct any coagulopathy with platelets and FFP for goal of platelets >50K and INR <2.0  - Maintain IV access with 2 large bore IVs  - Intravascular resuscitation/support with IVFs   - Hold all NSAIDs and anticoagulants, unless contraindicated  - Please notify GI team if there is significant change in patient's clinical status        Thank you for your consult. I will follow-up with the patient.     Trina Rios MD  Gastroenterology  [facility]

## 2023-07-05 NOTE — H&P
"[facility] - Emergency Dept  Critical Care Medicine  Consult Note    Patient Name: Poncho Guzman  MRN: [id number]  Admission Date: 7/4/2023  Hospital Length of Stay: 0 days  Code Status: Full Code  Attending Physician: Parminder Oakes MD   Primary Care Provider: Primary Doctor No   Principal Problem: GI bleed      Subjective:     HPI:  Mr. Guzman is a 62 year old male with PMHx of diverticular bleeds and an umbilical hernia. He presented to the emergency with a chief complaint of hematochezia since Sunday. This morning he felt lightheaded and dizzy and he was unable to stand up, so he called the paramedics. He denies chest pain, abdominal pain, rectal pain, and shortness of breath. He denies use of blood thinners. Patient says his hematochezia is sporadic and typically resolves on its own. Patient lives in [place] and is seen by GI docs at home. Believes last colonoscopy was 2 or 3 years ago.      Hospital/ICU Course:  Patient with Hgb of 5.2 and given 2 units of RBC. Patient also had bowel movement with several dark clots. Patient denies lightheadedness, pain and says "he feels so much better." Blood pressure normotensive to hypertensive. Heart rate WNL.       Past Medical History:   Diagnosis Date    Diverticular disease of large intestine without perforation or abscess     Hypertension        History reviewed. No pertinent surgical history.    Review of patient's allergies indicates:  No Known Allergies    Family History    None       Tobacco Use    Smoking status: Not on file    Smokeless tobacco: Not on file   Substance and Sexual Activity    Alcohol use: Not on file    Drug use: Not on file    Sexual activity: Not on file      Review of Systems   Constitutional:  Negative for chills, diaphoresis and fever.   Respiratory:  Negative for cough, chest tightness and shortness of breath.    Cardiovascular:  Negative for chest pain, palpitations and leg swelling.   Gastrointestinal:  Positive for blood in stool. Negative " for abdominal pain, nausea, rectal pain and vomiting.   Genitourinary:  Negative for decreased urine volume, difficulty urinating, dysuria, flank pain and hematuria.   Musculoskeletal:  Negative for back pain, joint swelling, myalgias and neck pain.   Skin:  Negative for color change and pallor.   Neurological:  Negative for dizziness, syncope and light-headedness.   Objective:     Vital Signs (Most Recent):  Temp: 99.4 °F (37.4 °C) (07/04/23 1730)  Pulse: 76 (07/04/23 1745)  Resp: 20 (07/04/23 1745)  BP: (!) 177/79 (07/04/23 1745)  SpO2: 100 % (07/04/23 1745) Vital Signs (24h Range):  Temp:  [97.7 °F (36.5 °C)-99.4 °F (37.4 °C)] 99.4 °F (37.4 °C)  Pulse:  [71-95] 76  Resp:  [10-21] 20  SpO2:  [95 %-100 %] 100 %  BP: (108-177)/(51-79) 177/79   Weight: 133.8 kg (294 lb 15.6 oz)  Body mass index is 42.32 kg/m².      Intake/Output Summary (Last 24 hours) at 7/4/2023 1811  Last data filed at 7/4/2023 1730  Gross per 24 hour   Intake 1734.95 ml   Output --   Net 1734.95 ml          Physical Exam  Constitutional:       General: He is not in acute distress.     Appearance: Normal appearance. He is not ill-appearing.   HENT:      Head: Normocephalic and atraumatic.      Mouth/Throat:      Mouth: Mucous membranes are dry.   Eyes:      General: No scleral icterus.     Extraocular Movements: Extraocular movements intact.   Cardiovascular:      Rate and Rhythm: Normal rate and regular rhythm.      Heart sounds: Normal heart sounds. No murmur heard.    No gallop.   Pulmonary:      Effort: Pulmonary effort is normal. No respiratory distress.      Breath sounds: Normal breath sounds. No wheezing or rhonchi.   Abdominal:      General: Bowel sounds are normal.      Palpations: Abdomen is soft. There is no mass.      Tenderness: There is no abdominal tenderness. There is no guarding.      Hernia: A hernia is present.      Comments: Known umbilical hernia   Musculoskeletal:         General: No swelling or tenderness.      Cervical  "back: Normal range of motion and neck supple.      Right lower leg: No edema.      Left lower leg: No edema.   Skin:     General: Skin is warm and dry.   Neurological:      General: No focal deficit present.      Mental Status: He is oriented to person, place, and time.   Psychiatric:         Mood and Affect: Mood normal.         Behavior: Behavior normal.          Vents:     Lines/Drains/Airways       Peripheral Intravenous Line  Duration                  Peripheral IV - Single Lumen 07/04/23 1422 20 G Anterior;Distal;Right Upper Arm <1 day         Peripheral IV - Single Lumen 07/04/23 18 G Left Antecubital <1 day                  Significant Labs:    CBC/Anemia Profile:  Recent Labs   Lab 07/04/23  1353   WBC 6.19   HGB 5.2*   HCT 17.3*      MCV 90   RDW 19.2*        Chemistries:  Recent Labs   Lab 07/04/23  1353      K 3.9   *   CO2 22*   BUN 19   CREATININE 1.1   CALCIUM 7.1*   ALBUMIN 2.5*   PROT 4.8*   BILITOT 0.2   ALKPHOS 37*   ALT 12   AST 13     BNP <10, troponin 0.011.      Significant Imaging: I have reviewed all pertinent imaging results/findings within the past 24 hours. CTA not concerning for an acute bleed.      ABG  No results for input(s): PH, PO2, PCO2, HCO3, BE in the last 168 hours.  Assessment/Plan:     GI  * GI bleed  - CTA without evidence of acute bleed  - Patient HD stable, systolics in 140s, normal mentation, no longer passing blood per rectum.  - Initial hemoglobin 5.2, 6.0 after 2u   - 2 uPRBC ordered, will recheck CBC after  - Continue to monitor for signs of rebleeding  - Recommend GI consult for possible scope when appropriate.    Patient report resolution of lightheadedness, dizziness, and nausea. Says "he feels great."  No acute bleeding signs on CTA. Hemodynamically stable. Will admit to floor     Umbilical hernia without obstruction and without gangrene  Here, no significant TTP nor signs of acute concern, defer to outpatient elective repair.        Critical " Care Daily Checklist:    A: Awake: RASS Goal/Actual Goal:  0  Actual:  0   B: Spontaneous Breathing Trial Performed?  N/A   C: SAT & SBT Coordinated?  N/A                      D: Delirium: CAM-ICU None   E: Early Mobility Performed? N/A   F: Feeding Goal:    Status:     Current Diet Order   Procedures    Diet clear liquid      AS: Analgesia/Sedation None   T: Thromboembolic Prophylaxis None (c/f acute bleed)   H: HOB > 300 Yes   U: Stress Ulcer Prophylaxis (if needed)    G: Glucose Control SSI   B: Bowel Function Hx hematochezia   I: Indwelling Catheter ([employer]) Necessity None   D: De-escalation of Antimicrobials/Pharmacotherapies N/a    Plan for the day/ETD Admit to floor    Code Status:  Family/Goals of Care: Full Code         Critical secondary to Patient has a condition that poses threat to life and bodily function: GI bleed with Hgb 5.2 --> 6.0     Critical care was time spent personally by me on the following activities: development of treatment plan with patient or surrogate and bedside caregivers, discussions with consultants, evaluation of patient's response to treatment, examination of patient, ordering and performing treatments and interventions, ordering and review of laboratory studies, ordering and review of radiographic studies, pulse oximetry, re-evaluation of patient's condition. This critical care time did not overlap with that of any other provider or involve time for any procedures.    Signed,  Stephanie Pacheco, PGY-1  Critical Care Medicine

## 2023-07-05 NOTE — ASSESSMENT & PLAN NOTE
61yo M for which GI was consulted for hematochezia. Pt has a h/o diverticular bleeds, HTN and umbilical hernia. Hematochezia and lightheadedness likely 2/2 diverticular bleed with H/H 5.2/17.3. H/H trending up slightly with 5u pRBC administered since admission. Due to significant bleeding and need for transfusions, will make NPO and plan for endoscopy later today.     Recs  - NPO for endoscopy later today   - PPI 40mg IV BID  - Trend Hgb q8hrs. Transfuse for Hgb > 7, unless otherwise indicated  - Please correct any coagulopathy with platelets and FFP for goal of platelets >50K and INR <2.0  - Maintain IV access with 2 large bore IVs  - Intravascular resuscitation/support with IVFs   - Hold all NSAIDs and anticoagulants, unless contraindicated  - Please notify GI team if there is significant change in patient's clinical status

## 2023-07-05 NOTE — TREATMENT PLAN
Please make sure patient starts Golytely prep at 6PM. Prep should be completed within 2 hours for best results, but must be completed within 4 hours of starting the prep.    Erma Kern DO, MS  Gastroenterology PGY-4

## 2023-07-06 ENCOUNTER — ANESTHESIA (OUTPATIENT)
Dept: ENDOSCOPY | Facility: HOSPITAL | Age: 63
DRG: 378 | End: 2023-07-06
Payer: COMMERCIAL

## 2023-07-06 PROBLEM — D62 ACUTE BLOOD LOSS ANEMIA: Status: ACTIVE | Noted: 2023-07-06

## 2023-07-06 PROBLEM — R91.8 LUNG NODULES: Status: ACTIVE | Noted: 2023-07-06

## 2023-07-06 PROBLEM — E66.01 MORBID OBESITY WITH BMI OF 40.0-44.9, ADULT: Status: ACTIVE | Noted: 2023-07-06

## 2023-07-06 LAB
ANION GAP SERPL CALC-SCNC: 6 MMOL/L (ref 8–16)
BASOPHILS # BLD AUTO: 0.01 K/UL (ref 0–0.2)
BASOPHILS # BLD AUTO: 0.03 K/UL (ref 0–0.2)
BASOPHILS NFR BLD: 0.2 % (ref 0–1.9)
BASOPHILS NFR BLD: 0.5 % (ref 0–1.9)
BLD PROD TYP BPU: NORMAL
BLD PROD TYP BPU: NORMAL
BLOOD UNIT EXPIRATION DATE: NORMAL
BLOOD UNIT EXPIRATION DATE: NORMAL
BLOOD UNIT TYPE CODE: 5100
BLOOD UNIT TYPE CODE: 5100
BLOOD UNIT TYPE: NORMAL
BLOOD UNIT TYPE: NORMAL
BUN SERPL-MCNC: 11 MG/DL (ref 8–23)
CALCIUM SERPL-MCNC: 7.9 MG/DL (ref 8.7–10.5)
CHLORIDE SERPL-SCNC: 111 MMOL/L (ref 95–110)
CO2 SERPL-SCNC: 25 MMOL/L (ref 23–29)
CODING SYSTEM: NORMAL
CODING SYSTEM: NORMAL
CREAT SERPL-MCNC: 1.2 MG/DL (ref 0.5–1.4)
CROSSMATCH INTERPRETATION: NORMAL
CROSSMATCH INTERPRETATION: NORMAL
DIFFERENTIAL METHOD: ABNORMAL
DIFFERENTIAL METHOD: ABNORMAL
DISPENSE STATUS: NORMAL
DISPENSE STATUS: NORMAL
EOSINOPHIL # BLD AUTO: 0.2 K/UL (ref 0–0.5)
EOSINOPHIL # BLD AUTO: 0.2 K/UL (ref 0–0.5)
EOSINOPHIL NFR BLD: 2.8 % (ref 0–8)
EOSINOPHIL NFR BLD: 3.1 % (ref 0–8)
ERYTHROCYTE [DISTWIDTH] IN BLOOD BY AUTOMATED COUNT: 16.2 % (ref 11.5–14.5)
ERYTHROCYTE [DISTWIDTH] IN BLOOD BY AUTOMATED COUNT: 16.5 % (ref 11.5–14.5)
EST. GFR  (NO RACE VARIABLE): >60 ML/MIN/1.73 M^2
GLUCOSE SERPL-MCNC: 82 MG/DL (ref 70–110)
HCT VFR BLD AUTO: 23.3 % (ref 40–54)
HCT VFR BLD AUTO: 24.9 % (ref 40–54)
HGB BLD-MCNC: 7.6 G/DL (ref 14–18)
HGB BLD-MCNC: 8.3 G/DL (ref 14–18)
IMM GRANULOCYTES # BLD AUTO: 0.01 K/UL (ref 0–0.04)
IMM GRANULOCYTES # BLD AUTO: 0.03 K/UL (ref 0–0.04)
IMM GRANULOCYTES NFR BLD AUTO: 0.2 % (ref 0–0.5)
IMM GRANULOCYTES NFR BLD AUTO: 0.5 % (ref 0–0.5)
LYMPHOCYTES # BLD AUTO: 1.8 K/UL (ref 1–4.8)
LYMPHOCYTES # BLD AUTO: 1.8 K/UL (ref 1–4.8)
LYMPHOCYTES NFR BLD: 29.4 % (ref 18–48)
LYMPHOCYTES NFR BLD: 33 % (ref 18–48)
MAGNESIUM SERPL-MCNC: 2 MG/DL (ref 1.6–2.6)
MCH RBC QN AUTO: 29.7 PG (ref 27–31)
MCH RBC QN AUTO: 29.9 PG (ref 27–31)
MCHC RBC AUTO-ENTMCNC: 32.6 G/DL (ref 32–36)
MCHC RBC AUTO-ENTMCNC: 33.3 G/DL (ref 32–36)
MCV RBC AUTO: 90 FL (ref 82–98)
MCV RBC AUTO: 91 FL (ref 82–98)
MONOCYTES # BLD AUTO: 0.4 K/UL (ref 0.3–1)
MONOCYTES # BLD AUTO: 0.6 K/UL (ref 0.3–1)
MONOCYTES NFR BLD: 6.7 % (ref 4–15)
MONOCYTES NFR BLD: 9.5 % (ref 4–15)
NEUTROPHILS # BLD AUTO: 3.1 K/UL (ref 1.8–7.7)
NEUTROPHILS # BLD AUTO: 3.5 K/UL (ref 1.8–7.7)
NEUTROPHILS NFR BLD: 57 % (ref 38–73)
NEUTROPHILS NFR BLD: 57.1 % (ref 38–73)
NRBC BLD-RTO: 0 /100 WBC
NRBC BLD-RTO: 1 /100 WBC
NUM UNITS TRANS FFP: NORMAL
NUM UNITS TRANS FFP: NORMAL
PHOSPHATE SERPL-MCNC: 3.2 MG/DL (ref 2.7–4.5)
PLATELET # BLD AUTO: 166 K/UL (ref 150–450)
PLATELET # BLD AUTO: 188 K/UL (ref 150–450)
PMV BLD AUTO: 9.5 FL (ref 9.2–12.9)
PMV BLD AUTO: 9.6 FL (ref 9.2–12.9)
POTASSIUM SERPL-SCNC: 3.7 MMOL/L (ref 3.5–5.1)
RBC # BLD AUTO: 2.56 M/UL (ref 4.6–6.2)
RBC # BLD AUTO: 2.78 M/UL (ref 4.6–6.2)
SODIUM SERPL-SCNC: 142 MMOL/L (ref 136–145)
WBC # BLD AUTO: 5.36 K/UL (ref 3.9–12.7)
WBC # BLD AUTO: 6.18 K/UL (ref 3.9–12.7)

## 2023-07-06 PROCEDURE — D9220A PRA ANESTHESIA: Mod: ANES,,, | Performed by: ANESTHESIOLOGY

## 2023-07-06 PROCEDURE — 85025 COMPLETE CBC W/AUTO DIFF WBC: CPT | Mod: 91

## 2023-07-06 PROCEDURE — 45378 PR COLONOSCOPY,DIAGNOSTIC: ICD-10-PCS | Mod: ,,, | Performed by: INTERNAL MEDICINE

## 2023-07-06 PROCEDURE — 43235 PR EGD, FLEX, DIAGNOSTIC: ICD-10-PCS | Mod: 51,,, | Performed by: INTERNAL MEDICINE

## 2023-07-06 PROCEDURE — 45378 DIAGNOSTIC COLONOSCOPY: CPT | Mod: ,,, | Performed by: INTERNAL MEDICINE

## 2023-07-06 PROCEDURE — D9220A PRA ANESTHESIA: ICD-10-PCS | Mod: CRNA,,, | Performed by: NURSE ANESTHETIST, CERTIFIED REGISTERED

## 2023-07-06 PROCEDURE — 63600175 PHARM REV CODE 636 W HCPCS: Performed by: NURSE ANESTHETIST, CERTIFIED REGISTERED

## 2023-07-06 PROCEDURE — 37000008 HC ANESTHESIA 1ST 15 MINUTES: Performed by: INTERNAL MEDICINE

## 2023-07-06 PROCEDURE — 80048 BASIC METABOLIC PNL TOTAL CA: CPT

## 2023-07-06 PROCEDURE — 45378 DIAGNOSTIC COLONOSCOPY: CPT | Performed by: INTERNAL MEDICINE

## 2023-07-06 PROCEDURE — D9220A PRA ANESTHESIA: Mod: CRNA,,, | Performed by: NURSE ANESTHETIST, CERTIFIED REGISTERED

## 2023-07-06 PROCEDURE — C9113 INJ PANTOPRAZOLE SODIUM, VIA: HCPCS

## 2023-07-06 PROCEDURE — 83735 ASSAY OF MAGNESIUM: CPT

## 2023-07-06 PROCEDURE — 37000009 HC ANESTHESIA EA ADD 15 MINS: Performed by: INTERNAL MEDICINE

## 2023-07-06 PROCEDURE — 99232 SBSQ HOSP IP/OBS MODERATE 35: CPT | Mod: ,,, | Performed by: STUDENT IN AN ORGANIZED HEALTH CARE EDUCATION/TRAINING PROGRAM

## 2023-07-06 PROCEDURE — D9220A PRA ANESTHESIA: ICD-10-PCS | Mod: ANES,,, | Performed by: ANESTHESIOLOGY

## 2023-07-06 PROCEDURE — 99232 PR SUBSEQUENT HOSPITAL CARE,LEVL II: ICD-10-PCS | Mod: ,,, | Performed by: STUDENT IN AN ORGANIZED HEALTH CARE EDUCATION/TRAINING PROGRAM

## 2023-07-06 PROCEDURE — 36415 COLL VENOUS BLD VENIPUNCTURE: CPT

## 2023-07-06 PROCEDURE — 94761 N-INVAS EAR/PLS OXIMETRY MLT: CPT

## 2023-07-06 PROCEDURE — 43235 EGD DIAGNOSTIC BRUSH WASH: CPT | Performed by: INTERNAL MEDICINE

## 2023-07-06 PROCEDURE — 25000003 PHARM REV CODE 250: Performed by: NURSE ANESTHETIST, CERTIFIED REGISTERED

## 2023-07-06 PROCEDURE — 84100 ASSAY OF PHOSPHORUS: CPT

## 2023-07-06 PROCEDURE — 63600175 PHARM REV CODE 636 W HCPCS

## 2023-07-06 PROCEDURE — 20600001 HC STEP DOWN PRIVATE ROOM

## 2023-07-06 PROCEDURE — 43235 EGD DIAGNOSTIC BRUSH WASH: CPT | Mod: 51,,, | Performed by: INTERNAL MEDICINE

## 2023-07-06 RX ORDER — PROPOFOL 10 MG/ML
VIAL (ML) INTRAVENOUS
Status: DISCONTINUED | OUTPATIENT
Start: 2023-07-06 | End: 2023-07-06

## 2023-07-06 RX ORDER — SODIUM CHLORIDE 0.9 % (FLUSH) 0.9 %
10 SYRINGE (ML) INJECTION
Status: DISCONTINUED | OUTPATIENT
Start: 2023-07-06 | End: 2023-07-06 | Stop reason: HOSPADM

## 2023-07-06 RX ORDER — LIDOCAINE HYDROCHLORIDE 20 MG/ML
INJECTION INTRAVENOUS
Status: DISCONTINUED | OUTPATIENT
Start: 2023-07-06 | End: 2023-07-06

## 2023-07-06 RX ADMIN — LIDOCAINE HYDROCHLORIDE 100 MG: 20 INJECTION INTRAVENOUS at 11:07

## 2023-07-06 RX ADMIN — GLYCOPYRROLATE 0.2 MG: 0.2 INJECTION, SOLUTION INTRAMUSCULAR; INTRAVENOUS at 11:07

## 2023-07-06 RX ADMIN — Medication 100 MG: at 11:07

## 2023-07-06 RX ADMIN — PANTOPRAZOLE SODIUM 40 MG: 40 INJECTION, POWDER, FOR SOLUTION INTRAVENOUS at 08:07

## 2023-07-06 RX ADMIN — PROPOFOL 100 MCG/KG/MIN: 10 INJECTION, EMULSION INTRAVENOUS at 11:07

## 2023-07-06 RX ADMIN — SODIUM CHLORIDE: 0.9 INJECTION, SOLUTION INTRAVENOUS at 11:07

## 2023-07-06 NOTE — NURSING TRANSFER
Nursing Transfer Note      7/6/2023     Reason patient is being transferred: post procedure    Transfer To: [id number]    Transfer via stretcher    Transfer with cardiac monitoring    Transported by transport    Telemetry: Box Number [id number], Rate nsr, Rhythm 63, and Telemetry  willard    Medicines sent: n/a    Any special needs or follow-up needed: n/a    Chart send with patient: Yes    Notified: friend    Patient reassessed at: 7/6/23 @4483

## 2023-07-06 NOTE — H&P
Endoscopy History and Physical    PCP - Primary Doctor No    Procedure - EGD and colonoscopy  ASA - per anesthesia  Mallampati - per anesthesia  Plan of anesthesia - MAC    HPI:  This is a 62 y.o. male here for evaluation of :  hematochezia    ROS:  Constitutional: No fevers, chills  CV: No chest pain  Pulm: No cough  Ophtho: No vision changes  GI: see HPI  Derm: No rash    Medical History:  has a past medical history of Diverticular disease of large intestine without perforation or abscess, HTN (hypertension) (7/5/2023), and Hypertension.    Surgical History:  has no past surgical history on file.    Family History: family history is not on file.. Otherwise no colon cancer, inflammatory bowel disease, or GI malignancies.    Social History:  reports that he has never smoked. He has never used smokeless tobacco. He reports current alcohol use.    Review of patient's allergies indicates:  No Known Allergies    Medications:   No medications prior to admission.         Vital Signs:   Vitals:    07/06/23 1105   BP: (!) 142/65   Pulse: 70   Resp: 16   Temp: 97.9 °F (36.6 °C)       General Appearance: Well appearing in no acute distress  Eyes:    No scleral icterus  ENT: atraumatic  Abdomen: Soft, nondistended  Extremities: no tenderness  Skin: normal color    Labs:  Lab Results   Component Value Date    WBC 5.36 07/06/2023    HGB 7.6 (L) 07/06/2023    HCT 23.3 (L) 07/06/2023     07/06/2023    ALT 12 07/04/2023    AST 13 07/04/2023     07/06/2023    K 3.7 07/06/2023     (H) 07/06/2023    CREATININE 1.2 07/06/2023    BUN 11 07/06/2023    CO2 25 07/06/2023       I have explained the risks and benefits of endoscopy procedures to the patient/their POA including but not limited to bleeding, perforation, infection, and death.  The patient/POA was asked if they understand and allowed to ask any further questions to their satisfaction.    Proceed with EGD and colonoscopy    Eric Kumari MD

## 2023-07-06 NOTE — NURSING
Nurses Note -- 4 Eyes      7/5/2023   11:07 PM      Skin assessed during: Admit      [x] No Altered Skin Integrity Present    []Prevention Measures Documented      [] Yes- Altered Skin Integrity Present or Discovered   [] LDA Added if Not in Epic (Describe Wound)   [] New Altered Skin Integrity was Present on Admit and Documented in LDA   [] Wound Image Taken    Wound Care Consulted? No    Attending Nurse:  Melanie Nolen RN     Second RN/Staff Member:  DANISHA Dumont

## 2023-07-06 NOTE — SUBJECTIVE & OBJECTIVE
Interval History:   No events overnight.  Hemoglobin down to 7.6 this morning.  Underwent EGD with GI that was unrevealing.  Trending hemoglobin.      Review of Systems   Constitutional:  Negative for activity change, appetite change, chills, diaphoresis, fatigue and fever.   HENT:  Negative for rhinorrhea and sore throat.    Respiratory:  Negative for cough, chest tightness and shortness of breath.    Cardiovascular:  Negative for chest pain and palpitations.   Gastrointestinal:  Negative for abdominal pain, constipation, diarrhea and nausea.   Endocrine: Negative for cold intolerance.   Genitourinary:  Negative for decreased urine volume and dysuria.   Musculoskeletal:  Negative for arthralgias and myalgias.   Skin:  Negative for rash and wound.   Neurological:  Negative for dizziness, weakness, numbness and headaches.   Psychiatric/Behavioral:  Negative for agitation, behavioral problems and confusion.    Objective:     Vital Signs (Most Recent):  Temp: 98.1 °F (36.7 °C) (07/06/23 1714)  Pulse: 80 (07/06/23 1714)  Resp: 20 (07/06/23 1714)  BP: (!) 94/50 (07/06/23 1714)  SpO2: 97 % (07/06/23 1714) Vital Signs (24h Range):  Temp:  [97.5 °F (36.4 °C)-98.7 °F (37.1 °C)] 98.1 °F (36.7 °C)  Pulse:  [61-84] 80  Resp:  [10-23] 20  SpO2:  [91 %-98 %] 97 %  BP: ()/(50-83) 94/50     Weight: (!) 136.9 kg (301 lb 13 oz)  Body mass index is 43.31 kg/m².    Intake/Output Summary (Last 24 hours) at 7/6/2023 1859  Last data filed at 7/6/2023 1723  Gross per 24 hour   Intake 820 ml   Output 450 ml   Net 370 ml         Physical Exam  Constitutional:       Appearance: Normal appearance. He is obese.   HENT:      Head: Normocephalic and atraumatic.      Mouth/Throat:      Mouth: Mucous membranes are moist.   Eyes:      Extraocular Movements: Extraocular movements intact.      Conjunctiva/sclera: Conjunctivae normal.   Cardiovascular:      Rate and Rhythm: Normal rate and regular rhythm.      Heart sounds: No murmur  heard.  Pulmonary:      Effort: Pulmonary effort is normal. No respiratory distress.      Breath sounds: Normal breath sounds. No wheezing or rales.   Abdominal:      General: Abdomen is flat. There is no distension.      Palpations: Abdomen is soft.      Tenderness: There is no abdominal tenderness. There is no guarding.   Musculoskeletal:         General: No swelling or tenderness.   Skin:     Findings: No rash.   Neurological:      General: No focal deficit present.      Mental Status: He is alert and oriented to person, place, and time. Mental status is at baseline.   Psychiatric:         Mood and Affect: Mood normal.         Behavior: Behavior normal.           Significant Labs: All pertinent labs within the past 24 hours have been reviewed.  CBC:   Recent Labs   Lab 07/05/23  2314 07/06/23  0803 07/06/23  1729   WBC 6.68 5.36 6.18   HGB 8.3* 7.6* 8.3*   HCT 25.2* 23.3* 24.9*    166 188     CMP:   Recent Labs   Lab 07/05/23  0223 07/06/23  0241    142   K 3.8 3.7   * 111*   CO2 22* 25   * 82   BUN 17 11   CREATININE 1.1 1.2   CALCIUM 7.0* 7.9*   ANIONGAP 6* 6*       Significant Imaging: I have reviewed all pertinent imaging results/findings within the past 24 hours.

## 2023-07-06 NOTE — PLAN OF CARE
I have reviewed the chart of Poncho Guzman and collaborated with Franki Trujillo MD in the care of the patient who is hospitalized for the following:    Active Hospital Problems    Diagnosis    *GI bleed     Hx recurrent diverticulosis, 7/4 presented for bloody BM.       Morbid obesity with BMI of 40.0-44.9, adult    Acute blood loss anemia    Lung nodules    HTN (hypertension)     Patient reports history of well controlled hypertension. Believes he takes hydralazine at home but is unsure of dosage. Follows with PCP in [place].      Umbilical hernia without obstruction and without gangrene     Seen by general surgery in May 2023, recommended weight loss prior to elective repair.               I have reviewed the Poncho Guzman with the multidisciplinary team during discharge huddle.       Valerie Robledo PA-C  [facility]

## 2023-07-06 NOTE — TRANSFER OF CARE
"Anesthesia Transfer of Care Note    Patient: Poncho Guzman    Procedure(s) Performed: Procedure(s) (LRB):  EGD (ESOPHAGOGASTRODUODENOSCOPY) (N/A)  COLONOSCOPY (N/A)    Patient location: PACU    Anesthesia Type: general    Transport from OR: Transported from OR on room air with adequate spontaneous ventilation    Post pain: adequate analgesia    Post assessment: no apparent anesthetic complications and tolerated procedure well    Post vital signs: stable    Level of consciousness: sedated    Nausea/Vomiting: no nausea/vomiting    Complications: none    Transfer of care protocol was followed      Last vitals:   Visit Vitals  BP (!) 142/65 (Patient Position: Lying)   Pulse 70   Temp 36.6 °C (97.9 °F) (Temporal)   Resp 16   Ht 5' 10" (1.778 m)   Wt (!) 136.9 kg (301 lb 13 oz)   SpO2 96%   BMI 43.31 kg/m²     "

## 2023-07-06 NOTE — PROVATION PATIENT INSTRUCTIONS
Discharge Summary/Instructions after an Endoscopic Procedure  Patient Name: Poncho Guzman  Patient MRN: [id number]  Patient YOB: 1960 Thursday, July 6, 2023  Erin Vides MD  Dear patient,  As a result of recent federal legislation (The Federal Cures Act), you may   receive lab or pathology results from your procedure in your [facility]   account before your physician is able to contact you. Your physician or   their representative will relay the results to you with their   recommendations at their soonest availability.  Thank you,  RESTRICTIONS:  During your procedure today, you received medications for sedation.  These   medications may affect your judgment, balance and coordination.  Therefore,   for 24 hours, you have the following restrictions:   - DO NOT drive a car, operate machinery, make legal/financial decisions,   sign important papers or drink alcohol.    ACTIVITY:  Today: no heavy lifting, straining or running due to procedural   sedation/anesthesia.  The following day: return to full activity including work.  DIET:  Eat and drink normally unless instructed otherwise.     TREATMENT FOR COMMON SIDE EFFECTS:  - Mild abdominal pain, nausea, belching, bloating or excessive gas:  rest,   eat lightly and use a heating pad.  - Sore Throat: treat with throat lozenges and/or gargle with warm salt   water.  - Because air was used during the procedure, expelling large amounts of air   from your rectum or belching is normal.  - If a bowel prep was taken, you may not have a bowel movement for 1-3 days.    This is normal.  SYMPTOMS TO WATCH FOR AND REPORT TO YOUR PHYSICIAN:  1. Abdominal pain or bloating, other than gas cramps.  2. Chest pain.  3. Back pain.  4. Signs of infection such as: chills or fever occurring within 24 hours   after the procedure.  5. Rectal bleeding, which would show as bright red, maroon, or black stools.   (A tablespoon of blood from the rectum is not serious, especially if    hemorrhoids are present.)  6. Vomiting.  7. Weakness or dizziness.  GO DIRECTLY TO THE NEAREST EMERGENCY ROOM IF YOU HAVE ANY OF THE FOLLOWING:      Difficulty breathing              Chills and/or fever over 101 F   Persistent vomiting and/or vomiting blood   Severe abdominal pain   Severe chest pain   Black, tarry stools   Bleeding- more than one tablespoon   Any other symptom or condition that you feel may need urgent attention  Your doctor recommends these additional instructions:  If any biopsies were taken, your doctors clinic will contact you in 1 to 2   weeks with any results.  - Return patient to [facility] for ongoing care. Ok for discharge to home   from a GI standpoint.  - Resume regular diet.   - Continue present medications.   - Repeat colonoscopy within 1 year due to fair prep.  For questions, problems or results please call your physician - Erin Vides MD at Work:  [phone].  [facility], EMERGENCY ROOM PHONE NUMBER: [phone]  IF A COMPLICATION OR EMERGENCY SITUATION ARISES AND YOU ARE UNABLE TO REACH   YOUR PHYSICIAN - GO DIRECTLY TO THE EMERGENCY ROOM.  Erin Vides MD  7/6/2023 12:09:59 PM  This report has been verified and signed electronically.  Dear patient,  As a result of recent federal legislation (The Federal Cures Act), you may   receive lab or pathology results from your procedure in your [facility]   account before your physician is able to contact you. Your physician or   their representative will relay the results to you with their   recommendations at their soonest availability.  Thank you,  PROVATION

## 2023-07-06 NOTE — PLAN OF CARE
Pt stable without c/o. Tolerating po intake well. Wife at bedside. Reinforced plan of care. Questions answered per GI team. Callbell within reach. Safety maintained.

## 2023-07-06 NOTE — PLAN OF CARE
Pt dx GI bleed. AAOX4. Stable. No c/o at present. Abd rounded, obese. Bowel prep for EGD and colonoscopy completed early this AM as stated per night nurse. PIV 18g to LAC and 20g to RFA intact secured and flushed. Discussed current plan of care, questions answered. Voiced understanding of information. Callbell within reach.

## 2023-07-06 NOTE — PROVATION PATIENT INSTRUCTIONS
Discharge Summary/Instructions after an Endoscopic Procedure  Patient Name: Poncho Guzman  Patient MRN: [id number]  Patient YOB: 1960 Thursday, July 6, 2023  Erin Vides MD  Dear patient,  As a result of recent federal legislation (The Federal Cures Act), you may   receive lab or pathology results from your procedure in your [facility]   account before your physician is able to contact you. Your physician or   their representative will relay the results to you with their   recommendations at their soonest availability.  Thank you,  RESTRICTIONS:  During your procedure today, you received medications for sedation.  These   medications may affect your judgment, balance and coordination.  Therefore,   for 24 hours, you have the following restrictions:   - DO NOT drive a car, operate machinery, make legal/financial decisions,   sign important papers or drink alcohol.    ACTIVITY:  Today: no heavy lifting, straining or running due to procedural   sedation/anesthesia.  The following day: return to full activity including work.  DIET:  Eat and drink normally unless instructed otherwise.     TREATMENT FOR COMMON SIDE EFFECTS:  - Mild abdominal pain, nausea, belching, bloating or excessive gas:  rest,   eat lightly and use a heating pad.  - Sore Throat: treat with throat lozenges and/or gargle with warm salt   water.  - Because air was used during the procedure, expelling large amounts of air   from your rectum or belching is normal.  - If a bowel prep was taken, you may not have a bowel movement for 1-3 days.    This is normal.  SYMPTOMS TO WATCH FOR AND REPORT TO YOUR PHYSICIAN:  1. Abdominal pain or bloating, other than gas cramps.  2. Chest pain.  3. Back pain.  4. Signs of infection such as: chills or fever occurring within 24 hours   after the procedure.  5. Rectal bleeding, which would show as bright red, maroon, or black stools.   (A tablespoon of blood from the rectum is not serious, especially if    hemorrhoids are present.)  6. Vomiting.  7. Weakness or dizziness.  GO DIRECTLY TO THE NEAREST EMERGENCY ROOM IF YOU HAVE ANY OF THE FOLLOWING:      Difficulty breathing              Chills and/or fever over 101 F   Persistent vomiting and/or vomiting blood   Severe abdominal pain   Severe chest pain   Black, tarry stools   Bleeding- more than one tablespoon   Any other symptom or condition that you feel may need urgent attention  Your doctor recommends these additional instructions:  If any biopsies were taken, your doctors clinic will contact you in 1 to 2   weeks with any results.  - Perform a colonoscopy today. See colonoscopy report for further   recommendations.  For questions, problems or results please call your physician - Erin Vides MD at Work:  [phone].  [facility], EMERGENCY ROOM PHONE NUMBER: [phone]  IF A COMPLICATION OR EMERGENCY SITUATION ARISES AND YOU ARE UNABLE TO REACH   YOUR PHYSICIAN - GO DIRECTLY TO THE EMERGENCY ROOM.  Erin Vides MD  7/6/2023 12:06:03 PM  This report has been verified and signed electronically.  Dear patient,  As a result of recent federal legislation (The Federal Cures Act), you may   receive lab or pathology results from your procedure in your [facility]   account before your physician is able to contact you. Your physician or   their representative will relay the results to you with their   recommendations at their soonest availability.  Thank you,  PROVATION

## 2023-07-06 NOTE — PLAN OF CARE
No events overnight. Pt AAOx4. RA. NSR. NPO. Bowel prep completed. Safety measures in place.       Problem: Adult Inpatient Plan of Care  Goal: Plan of Care Review  Outcome: Ongoing, Progressing  Goal: Patient-Specific Goal (Individualized)  Outcome: Ongoing, Progressing  Goal: Absence of Hospital-Acquired Illness or Injury  Outcome: Ongoing, Progressing  Goal: Optimal Comfort and Wellbeing  Outcome: Ongoing, Progressing  Goal: Readiness for Transition of Care  Outcome: Ongoing, Progressing     Problem: Bariatric Environmental Safety  Goal: Safety Maintained with Care  Outcome: Ongoing, Progressing

## 2023-07-06 NOTE — TREATMENT PLAN
GI Post Procedure Treatment Plan    EGD  Impression:     - Normal esophagus.                          - Z-line regular.                          - Small hiatal hernia.                          - Normal stomach.                          - Normal examined duodenum.                          - No specimens collected.     Colonoscopy  Impression:    - Preparation of the colon was fair.                          - Diverticulosis in the sigmoid colon, in the                          descending colon and in the transverse colon.                          - The examination was otherwise normal.                          - No specimens collected.     Recommendation:      - Return patient to hospital [facility] for ongoing care.   - Ok for discharge home from a GI standpoint.               - Resume regular diet.               - Continue present medications.               - Repeat colonoscopy within 1 year due to fair prep.     Kaylyn Robles MD  Gastroenterology, PGY-4

## 2023-07-07 VITALS
RESPIRATION RATE: 16 BRPM | OXYGEN SATURATION: 97 % | HEIGHT: 70 IN | SYSTOLIC BLOOD PRESSURE: 118 MMHG | HEART RATE: 87 BPM | DIASTOLIC BLOOD PRESSURE: 62 MMHG | WEIGHT: 301.81 LBS | TEMPERATURE: 99 F | BODY MASS INDEX: 43.21 KG/M2

## 2023-07-07 LAB
BASOPHILS # BLD AUTO: 0.02 K/UL (ref 0–0.2)
BASOPHILS NFR BLD: 0.3 % (ref 0–1.9)
DIFFERENTIAL METHOD: ABNORMAL
EOSINOPHIL # BLD AUTO: 0.2 K/UL (ref 0–0.5)
EOSINOPHIL NFR BLD: 2.8 % (ref 0–8)
ERYTHROCYTE [DISTWIDTH] IN BLOOD BY AUTOMATED COUNT: 16.2 % (ref 11.5–14.5)
HCT VFR BLD AUTO: 26.4 % (ref 40–54)
HGB BLD-MCNC: 8.3 G/DL (ref 14–18)
IMM GRANULOCYTES # BLD AUTO: 0.02 K/UL (ref 0–0.04)
IMM GRANULOCYTES NFR BLD AUTO: 0.3 % (ref 0–0.5)
LYMPHOCYTES # BLD AUTO: 2 K/UL (ref 1–4.8)
LYMPHOCYTES NFR BLD: 34.1 % (ref 18–48)
MCH RBC QN AUTO: 29.3 PG (ref 27–31)
MCHC RBC AUTO-ENTMCNC: 31.4 G/DL (ref 32–36)
MCV RBC AUTO: 93 FL (ref 82–98)
MONOCYTES # BLD AUTO: 0.4 K/UL (ref 0.3–1)
MONOCYTES NFR BLD: 6.1 % (ref 4–15)
NEUTROPHILS # BLD AUTO: 3.3 K/UL (ref 1.8–7.7)
NEUTROPHILS NFR BLD: 56.4 % (ref 38–73)
NRBC BLD-RTO: 0 /100 WBC
PLATELET # BLD AUTO: 225 K/UL (ref 150–450)
PMV BLD AUTO: 9.4 FL (ref 9.2–12.9)
RBC # BLD AUTO: 2.83 M/UL (ref 4.6–6.2)
WBC # BLD AUTO: 5.78 K/UL (ref 3.9–12.7)

## 2023-07-07 PROCEDURE — 99239 PR HOSPITAL DISCHARGE DAY,>30 MIN: ICD-10-PCS | Mod: ,,, | Performed by: STUDENT IN AN ORGANIZED HEALTH CARE EDUCATION/TRAINING PROGRAM

## 2023-07-07 PROCEDURE — 99239 HOSP IP/OBS DSCHRG MGMT >30: CPT | Mod: ,,, | Performed by: STUDENT IN AN ORGANIZED HEALTH CARE EDUCATION/TRAINING PROGRAM

## 2023-07-07 PROCEDURE — 85025 COMPLETE CBC W/AUTO DIFF WBC: CPT | Performed by: STUDENT IN AN ORGANIZED HEALTH CARE EDUCATION/TRAINING PROGRAM

## 2023-07-07 PROCEDURE — 36415 COLL VENOUS BLD VENIPUNCTURE: CPT | Performed by: STUDENT IN AN ORGANIZED HEALTH CARE EDUCATION/TRAINING PROGRAM

## 2023-07-07 RX ORDER — FERROUS SULFATE 324(65)MG
324 TABLET, DELAYED RELEASE (ENTERIC COATED) ORAL DAILY
Qty: 90 TABLET | Refills: 2 | Status: SHIPPED | OUTPATIENT
Start: 2023-07-07

## 2023-07-07 RX ORDER — SUCRALFATE 1 G/10ML
1 SUSPENSION ORAL 4 TIMES DAILY
Qty: 414 ML | Refills: 0 | Status: SHIPPED | OUTPATIENT
Start: 2023-07-07

## 2023-07-07 NOTE — PROGRESS NOTES
[facility] - Intensive Care ([address] Medicine  Progress Note    Patient Name: Poncho Guzman  MRN: [id number]  Patient Class: IP- Inpatient   Admission Date: 7/4/2023  Length of Stay: 2 days  Attending Physician: Franki Trujillo MD  Primary Care Provider: Primary Doctor No        Subjective:     Principal Problem:GI bleed        HPI:  No notes on file    Overview/Hospital Course:  No notes on file    Interval History:   No events overnight.  Hemoglobin down to 7.6 this morning.  Underwent EGD with GI that was unrevealing.  Trending hemoglobin.      Review of Systems   Constitutional:  Negative for activity change, appetite change, chills, diaphoresis, fatigue and fever.   HENT:  Negative for rhinorrhea and sore throat.    Respiratory:  Negative for cough, chest tightness and shortness of breath.    Cardiovascular:  Negative for chest pain and palpitations.   Gastrointestinal:  Negative for abdominal pain, constipation, diarrhea and nausea.   Endocrine: Negative for cold intolerance.   Genitourinary:  Negative for decreased urine volume and dysuria.   Musculoskeletal:  Negative for arthralgias and myalgias.   Skin:  Negative for rash and wound.   Neurological:  Negative for dizziness, weakness, numbness and headaches.   Psychiatric/Behavioral:  Negative for agitation, behavioral problems and confusion.    Objective:     Vital Signs (Most Recent):  Temp: 98.1 °F (36.7 °C) (07/06/23 1714)  Pulse: 80 (07/06/23 1714)  Resp: 20 (07/06/23 1714)  BP: (!) 94/50 (07/06/23 1714)  SpO2: 97 % (07/06/23 1714) Vital Signs (24h Range):  Temp:  [97.5 °F (36.4 °C)-98.7 °F (37.1 °C)] 98.1 °F (36.7 °C)  Pulse:  [61-84] 80  Resp:  [10-23] 20  SpO2:  [91 %-98 %] 97 %  BP: ()/(50-83) 94/50     Weight: (!) 136.9 kg (301 lb 13 oz)  Body mass index is 43.31 kg/m².    Intake/Output Summary (Last 24 hours) at 7/6/2023 1859  Last data filed at 7/6/2023 1723  Gross per 24 hour   Intake 820 ml   Output 450 ml   Net 370 ml          Physical Exam  Constitutional:       Appearance: Normal appearance. He is obese.   HENT:      Head: Normocephalic and atraumatic.      Mouth/Throat:      Mouth: Mucous membranes are moist.   Eyes:      Extraocular Movements: Extraocular movements intact.      Conjunctiva/sclera: Conjunctivae normal.   Cardiovascular:      Rate and Rhythm: Normal rate and regular rhythm.      Heart sounds: No murmur heard.  Pulmonary:      Effort: Pulmonary effort is normal. No respiratory distress.      Breath sounds: Normal breath sounds. No wheezing or rales.   Abdominal:      General: Abdomen is flat. There is no distension.      Palpations: Abdomen is soft.      Tenderness: There is no abdominal tenderness. There is no guarding.   Musculoskeletal:         General: No swelling or tenderness.   Skin:     Findings: No rash.   Neurological:      General: No focal deficit present.      Mental Status: He is alert and oriented to person, place, and time. Mental status is at baseline.   Psychiatric:         Mood and Affect: Mood normal.         Behavior: Behavior normal.           Significant Labs: All pertinent labs within the past 24 hours have been reviewed.  CBC:   Recent Labs   Lab 07/05/23  2314 07/06/23  0803 07/06/23  1729   WBC 6.68 5.36 6.18   HGB 8.3* 7.6* 8.3*   HCT 25.2* 23.3* 24.9*    166 188     CMP:   Recent Labs   Lab 07/05/23  0223 07/06/23  0241    142   K 3.8 3.7   * 111*   CO2 22* 25   * 82   BUN 17 11   CREATININE 1.1 1.2   CALCIUM 7.0* 7.9*   ANIONGAP 6* 6*       Significant Imaging: I have reviewed all pertinent imaging results/findings within the past 24 hours.      Assessment/Plan:      * GI bleed  - CTA without evidence of acute bleed  - Patient HD stable, systolics in 140s, normal mentation  - Reports one stool at 2 AM with dark bloody clots. Has had flatus with no blood expulsion.  - 2 uPRBC given this morning  - Most recent Hgb 6.2, was 5.0 in ED  - Continue to monitor for  signs of rebleeding  - GI consulted and scope unrevealing    Lung nodules  Outpatient follow-up      Acute blood loss anemia  See above      Morbid obesity with BMI of 40.0-44.9, adult  Body mass index is 43.31 kg/m². Morbid obesity complicates all aspects of disease management from diagnostic modalities to treatment. Weight loss encouraged and health benefits explained to patient.         HTN (hypertension)  -- Hold home hydralazine prior to GI scope  -- Resume if hemodynamically toleratable    Umbilical hernia without obstruction and without gangrene  Outpatient follow-up        VTE Risk Mitigation (From admission, onward)         Ordered     IP VTE HIGH RISK PATIENT  Once         07/04/23 1857     Place sequential compression device  Until discontinued         07/04/23 1857                Discharge Planning   ZEHRA: 7/7/2023     Code Status: Full Code   Is the patient medically ready for discharge?: No    Reason for patient still in hospital (select all that apply): Patient trending condition, Laboratory test, Treatment, Consult recommendations and Pending disposition  Discharge Plan A: Home with family                  Franki Trujillo MD  Department of Hospital Medicine   [facility] - Intensive Care ([place]-[facility])

## 2023-07-07 NOTE — PLAN OF CARE
[facility] - Int[facility] Care ([facility]-)  Discharge Final Note    Primary Care Provider: Primary Doctor No    Expected Discharge Date: 7/7/2023    Final Discharge Note (most recent)       Final Note - 07/07/23 1545          Final Note    Assessment Type Final Discharge Note     Anticipated Discharge Disposition Home or Self Care     What phone number can be called within the next 1-3 days to see how you are doing after discharge? [phone]     Hospital Resources/Appts/Education Provided Provided patient/caregiver with written discharge plan information        Post-Acute Status    Post-Acute Authorization Other     Coverage [facility] CHOICE PLUS     Other Status No Post-Acute Service Needs     Discharge Delays None known at this time                      The patient is being d/c today with no social service needs identified at this time.     Sharron Rivers RN  Case Management  [facility]  [phone]

## 2023-07-07 NOTE — ASSESSMENT & PLAN NOTE
- CTA without evidence of acute bleed  - Patient HD stable, systolics in 140s, normal mentation  - Reports one stool at 2 AM with dark bloody clots. Has had flatus with no blood expulsion.  - 2 uPRBC given this morning  - Most recent Hgb 6.2, was 5.0 in ED  - Continue to monitor for signs of rebleeding  - GI consulted and scope unrevealing

## 2023-07-07 NOTE — ASSESSMENT & PLAN NOTE
Body mass index is 43.31 kg/m². Morbid obesity complicates all aspects of disease management from diagnostic modalities to treatment. Weight loss encouraged and health benefits explained to patient.

## 2023-07-07 NOTE — PLAN OF CARE
07/07/23 1545   Final Note   Assessment Type Final Discharge Note   Anticipated Discharge Disposition Home   What phone number can be called within the next 1-3 days to see how you are doing after discharge? [phone]   Hospital Resources/Appts/Education Provided Provided patient/caregiver with written discharge plan information   Post-Acute Status   Post-Acute Authorization Other   Coverage [facility] CHOICE PLUS   Other Status No Post-Acute Service Needs   Discharge Delays None known at this time     Patient dc'd home with family and plans to return to [place]. Patient will schedule an appointment with his PCP after he returns home.    Sharron Rivers RN  Case Management  [facility]  [phone]

## 2023-07-07 NOTE — PLAN OF CARE
No events overnight. AAOx4. RA. NSR. RA. VSS. Ambulated in room. Safety measures in place.        Problem: Adult Inpatient Plan of Care  Goal: Plan of Care Review  Outcome: Ongoing, Progressing  Goal: Patient-Specific Goal (Individualized)  Outcome: Ongoing, Progressing  Goal: Absence of Hospital-Acquired Illness or Injury  Outcome: Ongoing, Progressing  Goal: Optimal Comfort and Wellbeing  Outcome: Ongoing, Progressing  Goal: Readiness for Transition of Care  Outcome: Ongoing, Progressing     Problem: Bariatric Environmental Safety  Goal: Safety Maintained with Care  Outcome: Ongoing, Progressing     Problem: Fall Injury Risk  Goal: Absence of Fall and Fall-Related Injury  Outcome: Ongoing, Progressing

## 2023-07-07 NOTE — ANESTHESIA POSTPROCEDURE EVALUATION
Anesthesia Post Evaluation    Patient: Poncho Guzman    Procedure(s) Performed: Procedure(s) (LRB):  EGD (ESOPHAGOGASTRODUODENOSCOPY) (N/A)  COLONOSCOPY (N/A)    Final Anesthesia Type: general      Patient location during evaluation: PACU  Patient participation: Yes- Able to Participate  Level of consciousness: awake and alert  Post-procedure vital signs: reviewed and stable  Pain management: adequate  Airway patency: patent    PONV status at discharge: No PONV  Anesthetic complications: no      Cardiovascular status: blood pressure returned to baseline  Respiratory status: unassisted, spontaneous ventilation and room air  Hydration status: euvolemic            Vitals Value Taken Time   /62 07/07/23 1154   Temp 36.9 °C (98.5 °F) 07/07/23 1154   Pulse 87 07/07/23 1154   Resp 16 07/07/23 1154   SpO2 97 % 07/07/23 1154         Event Time   Out of Recovery 13:24:00         Pain/Yen Score: Yen Score: 9 (7/6/2023 12:15 PM)

## 2023-07-07 NOTE — PLAN OF CARE
Pt dx GI bleed. AAOX4. VS stable,. Ambulated in room. Tolerating po intake. No c/o. Explained current plan of care. Voiced understanding. Questions answered. Callbell within reach.

## 2023-07-07 NOTE — NURSING
Discharge instructions discussed with patient and wife, all questions were answered.  Patient is flying home to [place] tomorrow on 7/8, and will follow up with his primary and GI while there.  Ivs have been removed, awaiting for meds to be delivered to the bedside.

## 2023-07-09 NOTE — HOSPITAL COURSE
"Patient with Hgb of 5.2 and given 2 units of RBC. Patient also had bowel movement with several dark clots. Patient denies lightheadedness, pain and says "he feels so much better." Blood pressure normotensive to hypertensive. Patient reported 1 small bloody BM at 2 AM. Received 2 unites of blood and most recent Hgb 6.2. GI consulted. Patient underwent EGD and colonoscopy. EGD was unremarkable. Colonoscopy with fair preparation and diverticulosis in the sigmoid colon, in the descending colon and in the transverse colon. Hgb stable post procedure. GI with recommendation for repeat colonoscopy within 1 year due to fair prep. Discharged with iron supplementation. Patient to follow up with PCP and GI in his home state.  "

## 2023-07-09 NOTE — DISCHARGE SUMMARY
"[facility] - Intensive Care ([place] [id number])  [facility] Medicine  Discharge Summary      Patient Name: Poncho Guzman  MRN: [id number]  MARY: [id number]  Patient Class: IP- Inpatient  Admission Date: 7/4/2023  Hospital Length of Stay: 3 days  Discharge Date and Time: 7/7/2023  2:45 PM  Attending Physician: Faby att. providers found   Discharging Provider: Franki Trujillo MD  Primary Care Provider: Primary Doctor Faby  [facility] Medicine Team: [facility] [place] [facility] MED D Franki Trujillo MD  Primary Care Team: [facility] [place]    HPI:   Mr. Guzman is a 62 year old male with PMHx of diverticular bleeds and an umbilical hernia. He presented to the emergency with a chief complaint of hematochezia since Sunday. This morning he felt lightheaded and dizzy and he was unable to stand up, so he called the paramedics. He denies chest pain, abdominal pain, rectal pain, and shortness of breath. He denies use of blood thinners. Patient says his hematochezia is sporadic and typically resolves on its own. Patient lives in [place] and is seen by GI docs at home. Believes last colonoscopy was 2 or 3 years ago.      Procedure(s) (LRB):  EGD (ESOPHAGOGASTRODUODENOSCOPY) (N/A)  COLONOSCOPY (N/A)      Hospital Course:   Patient with Hgb of 5.2 and given 2 units of RBC. Patient also had bowel movement with several dark clots. Patient denies lightheadedness, pain and says "he feels so much better." Blood pressure normotensive to hypertensive. Patient reported 1 small bloody BM at 2 AM. Received 2 unites of blood and most recent Hgb 6.2. GI consulted. Patient underwent EGD and colonoscopy. EGD was unremarkable. Colonoscopy with fair preparation and diverticulosis in the sigmoid colon, in the descending colon and in the transverse colon. Hgb stable post procedure. GI with recommendation for repeat colonoscopy within 1 year due to fair prep. Discharged with iron supplementation. Patient to follow up with PCP and GI in his home state.       Goals of Care " Treatment Preferences:  Code Status: Full Code      Consults:   Consults (From admission, onward)        Status Ordering Provider     Inpatient consult to Gastroenterology  Once        Provider:  (Not yet assigned)    Completed ITA TAM     Inpatient consult to Critical Care Medicine  Once        Provider:  (Not yet assigned)    Completed [place] new Assessment & Plan notes have been filed under this hospital service since the last note was generated.  Service: Hospital Medicine    Final Active Diagnoses:    Diagnosis Date Noted POA    PRINCIPAL PROBLEM:  GI bleed [K92.2] 07/04/2023 Yes    Morbid obesity with BMI of 40.0-44.9, adult [E66.01, Z68.41] 07/06/2023 Not Applicable    Acute blood loss anemia [D62] 07/06/2023 Yes    Lung nodules [R91.8] 07/06/2023 Yes    HTN (hypertension) [I10] 07/05/2023 Yes    Umbilical hernia without obstruction and without gangrene [K42.9] 07/04/2023 Yes      Problems Resolved During this Admission:       Discharged Condition: good    Disposition: Home or Self Care    Follow Up:   Follow-up Information     Primary Doctor No .                     Patient Instructions:      Ambulatory referral/consult to Internal Medicine   Standing Status: Future   Referral Priority: Routine Referral Type: Consultation   Referral Reason: Specialty Services Required   Requested Specialty: Internal Medicine   Number of Visits Requested: 1     Diet Adult Regular     Notify your health care provider if you experience any of the following:  increased confusion or weakness     Notify your health care provider if you experience any of the following:  persistent dizziness, light-headedness, or visual disturbances     Notify your health care provider if you experience any of the following:  worsening rash     Notify your health care provider if you experience any of the following:  severe persistent headache     Notify your health care provider if you experience any of the following:   difficulty breathing or increased cough     Notify your health care provider if you experience any of the following:  redness, tenderness, or signs of infection (pain, swelling, redness, odor or green/yellow discharge around incision site)     Notify your health care provider if you experience any of the following:  severe uncontrolled pain     Notify your health care provider if you experience any of the following:  persistent nausea and vomiting or diarrhea     Notify your health care provider if you experience any of the following:  temperature >100.4     Activity as tolerated       Significant Diagnostic Studies: Labs: All labs within the past 24 hours have been reviewed    Pending Diagnostic Studies:     None         Medications:  Reconciled Home Medications:      Medication List      START taking these medications    ferrous sulfate 324 mg (65 mg iron) Tbec  Take 1 tablet (324 mg total) by mouth once daily.     sucralfate 100 mg/mL suspension  Commonly known as: CARAFATE  Take 10 mLs (1 g total) by mouth 4 (four) times daily.            Indwelling Lines/Drains at time of discharge:   Lines/Drains/Airways     None                 Time spent on the discharge of patient: 35 minutes         Franki Trujillo MD  Department of Hospital Medicine  [facility] - Intensive Care ([place]-)

## 2023-10-09 PROBLEM — K92.2 GI BLEED: Status: RESOLVED | Noted: 2023-07-04 | Resolved: 2023-10-09
